# Patient Record
Sex: FEMALE | Race: WHITE | URBAN - NONMETROPOLITAN AREA
[De-identification: names, ages, dates, MRNs, and addresses within clinical notes are randomized per-mention and may not be internally consistent; named-entity substitution may affect disease eponyms.]

---

## 2017-01-04 RX ORDER — BUPROPION HYDROCHLORIDE 150 MG/1
150 TABLET ORAL EVERY MORNING
Qty: 30 TABLET | Refills: 0 | Status: SHIPPED | OUTPATIENT
Start: 2017-01-04 | End: 2017-02-01 | Stop reason: SDUPTHER

## 2017-01-04 RX ORDER — ZOLPIDEM TARTRATE 10 MG/1
10 TABLET ORAL NIGHTLY PRN
Qty: 30 TABLET | Refills: 0 | Status: SHIPPED | OUTPATIENT
Start: 2017-01-04 | End: 2017-02-01 | Stop reason: SDUPTHER

## 2017-02-21 ENCOUNTER — OFFICE VISIT (OUTPATIENT)
Dept: PSYCHIATRY | Age: 37
End: 2017-02-21

## 2017-02-21 DIAGNOSIS — F34.1 DYSTHYMIA: Primary | ICD-10-CM

## 2017-02-21 PROCEDURE — 99213 OFFICE O/P EST LOW 20 MIN: CPT | Performed by: NURSE PRACTITIONER

## 2017-02-21 RX ORDER — BUPROPION HYDROCHLORIDE 150 MG/1
300 TABLET ORAL EVERY MORNING
Qty: 60 TABLET | Refills: 3 | Status: SHIPPED | OUTPATIENT
Start: 2017-02-21 | End: 2017-06-09 | Stop reason: SDUPTHER

## 2017-02-21 RX ORDER — ZOLPIDEM TARTRATE 10 MG/1
10 TABLET ORAL NIGHTLY PRN
Qty: 30 TABLET | Refills: 0 | Status: SHIPPED | OUTPATIENT
Start: 2017-02-21 | End: 2017-03-24 | Stop reason: SDUPTHER

## 2017-03-24 RX ORDER — ZOLPIDEM TARTRATE 10 MG/1
10 TABLET ORAL NIGHTLY PRN
Qty: 30 TABLET | Refills: 0 | Status: SHIPPED | OUTPATIENT
Start: 2017-03-24 | End: 2017-04-23

## 2017-04-26 RX ORDER — ZOLPIDEM TARTRATE 10 MG/1
10 TABLET ORAL NIGHTLY PRN
Qty: 30 TABLET | Refills: 0 | Status: SHIPPED | OUTPATIENT
Start: 2017-04-26 | End: 2017-05-31 | Stop reason: SDUPTHER

## 2017-05-31 RX ORDER — ZOLPIDEM TARTRATE 10 MG/1
10 TABLET ORAL NIGHTLY PRN
Qty: 30 TABLET | Refills: 0 | Status: SHIPPED | OUTPATIENT
Start: 2017-05-31 | End: 2017-06-26 | Stop reason: SDUPTHER

## 2017-06-09 RX ORDER — BUPROPION HYDROCHLORIDE 150 MG/1
300 TABLET ORAL EVERY MORNING
Qty: 60 TABLET | Refills: 3 | Status: SHIPPED | OUTPATIENT
Start: 2017-06-09 | End: 2017-09-21 | Stop reason: SDUPTHER

## 2017-06-26 RX ORDER — ZOLPIDEM TARTRATE 10 MG/1
10 TABLET ORAL NIGHTLY PRN
Qty: 30 TABLET | Refills: 0 | Status: SHIPPED | OUTPATIENT
Start: 2017-06-26 | End: 2017-08-01 | Stop reason: SDUPTHER

## 2017-08-01 RX ORDER — ZOLPIDEM TARTRATE 10 MG/1
10 TABLET ORAL NIGHTLY PRN
Qty: 30 TABLET | Refills: 0 | Status: SHIPPED | OUTPATIENT
Start: 2017-08-01 | End: 2017-08-28 | Stop reason: SDUPTHER

## 2017-08-28 RX ORDER — ZOLPIDEM TARTRATE 10 MG/1
10 TABLET ORAL NIGHTLY PRN
Qty: 30 TABLET | Refills: 0 | Status: SHIPPED | OUTPATIENT
Start: 2017-08-28 | End: 2017-09-27 | Stop reason: SDUPTHER

## 2017-09-21 ENCOUNTER — OFFICE VISIT (OUTPATIENT)
Dept: PSYCHIATRY | Age: 37
End: 2017-09-21
Payer: COMMERCIAL

## 2017-09-21 VITALS — WEIGHT: 182 LBS

## 2017-09-21 DIAGNOSIS — F34.1 DYSTHYMIA: ICD-10-CM

## 2017-09-21 DIAGNOSIS — F41.0 PANIC ATTACK DUE TO EXCEPTIONAL STRESS: Primary | ICD-10-CM

## 2017-09-21 DIAGNOSIS — F43.0 PANIC ATTACK DUE TO EXCEPTIONAL STRESS: Primary | ICD-10-CM

## 2017-09-21 PROCEDURE — 99213 OFFICE O/P EST LOW 20 MIN: CPT | Performed by: NURSE PRACTITIONER

## 2017-09-21 RX ORDER — BUPROPION HYDROCHLORIDE 450 MG/1
450 TABLET, FILM COATED, EXTENDED RELEASE ORAL EVERY MORNING
Qty: 30 TABLET | Refills: 2 | Status: SHIPPED | OUTPATIENT
Start: 2017-09-21 | End: 2017-10-24 | Stop reason: SDUPTHER

## 2017-09-21 RX ORDER — HYDROXYZINE PAMOATE 25 MG/1
25 CAPSULE ORAL 3 TIMES DAILY PRN
Qty: 90 CAPSULE | Refills: 1 | Status: SHIPPED | OUTPATIENT
Start: 2017-09-21 | End: 2017-10-05

## 2017-09-22 RX ORDER — BUPROPION HYDROCHLORIDE 150 MG/1
450 TABLET ORAL EVERY MORNING
Qty: 90 TABLET | Refills: 2 | Status: SHIPPED | OUTPATIENT
Start: 2017-09-22 | End: 2017-12-27 | Stop reason: SDUPTHER

## 2017-09-27 RX ORDER — ZOLPIDEM TARTRATE 10 MG/1
10 TABLET ORAL NIGHTLY PRN
Qty: 30 TABLET | Refills: 0 | Status: SHIPPED | OUTPATIENT
Start: 2017-09-27 | End: 2017-10-24 | Stop reason: SDUPTHER

## 2017-10-24 ENCOUNTER — OFFICE VISIT (OUTPATIENT)
Dept: PSYCHIATRY | Age: 37
End: 2017-10-24
Payer: COMMERCIAL

## 2017-10-24 VITALS — WEIGHT: 180 LBS

## 2017-10-24 DIAGNOSIS — F41.9 ANXIETY DISORDER, UNSPECIFIED TYPE: ICD-10-CM

## 2017-10-24 DIAGNOSIS — F34.1 DYSTHYMIA: Primary | ICD-10-CM

## 2017-10-24 PROCEDURE — 99213 OFFICE O/P EST LOW 20 MIN: CPT | Performed by: NURSE PRACTITIONER

## 2017-10-24 PROCEDURE — 1036F TOBACCO NON-USER: CPT | Performed by: NURSE PRACTITIONER

## 2017-10-24 PROCEDURE — G8427 DOCREV CUR MEDS BY ELIG CLIN: HCPCS | Performed by: NURSE PRACTITIONER

## 2017-10-24 PROCEDURE — G8484 FLU IMMUNIZE NO ADMIN: HCPCS | Performed by: NURSE PRACTITIONER

## 2017-10-24 PROCEDURE — G8421 BMI NOT CALCULATED: HCPCS | Performed by: NURSE PRACTITIONER

## 2017-10-24 RX ORDER — ZOLPIDEM TARTRATE 10 MG/1
10 TABLET ORAL NIGHTLY PRN
Qty: 30 TABLET | Refills: 0 | Status: SHIPPED | OUTPATIENT
Start: 2017-10-24 | End: 2017-11-23

## 2017-10-24 RX ORDER — HYDROXYZINE 50 MG/1
50 TABLET, FILM COATED ORAL 3 TIMES DAILY PRN
Qty: 90 TABLET | Refills: 0 | Status: SHIPPED | OUTPATIENT
Start: 2017-10-24 | End: 2017-11-25 | Stop reason: SDUPTHER

## 2017-10-24 NOTE — PROGRESS NOTES
[facility] PROFESSIONAL SERVS  [facility]  [address] [facility]  Dept: [phone]  Dept Fax: [phone]: [phone]      Visit Date: 10/24/2017      Pertinent History & Psychiatric Examination      Harris Brewster is a 40 y.o.  female returns today after 1 month since our last visit for follow-up medication management . Chief Complaint   Patient presents with    Depression     Dysthymia    Anxiety     Anxiety disorder NOS         She reports with good improvement In her mood. Patient states \" I am better now at least and no longer sobbing in public\". Reports that she still depressed but not as elevated as in the past.  Patient has a history of dysthymia. She still reports increased anxiety and states that hydroxyzine is not helping. Patient reports tightness in the chest, unable to breath but denies any tremors/shakes or death/doom feelings. Patient also reports racing heart as well. She reports that stressors are work related and that she worries constantly. She reports that this happens about 5 times every week. Patient has had counseling but states that it did not help much. Patient is sleeping well on the Ambien and she denies any thoughts to hurt herself. She denies any hallucinations taking her medications and denies any side effects. 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 She is looking good and she is pleasant and cooperative and is requesting to return back to the office in 6 months time since she is doing okay. Past Surgical History:   Procedure Laterality Date    BREAST SURGERY       SECTION Bilateral      No family history on file.   Social History   Substance Use Topics    Smoking status: Never Smoker    Smokeless tobacco: Not on file    Alcohol use 1.8 oz/week     3 Glasses of wine per week      Comment: not daily     Current Juliette Castle, CNP on 10/24/2017 at 2:53 PM

## 2017-11-29 RX ORDER — ZOLPIDEM TARTRATE 10 MG/1
10 TABLET ORAL NIGHTLY PRN
Qty: 30 TABLET | Refills: 0 | Status: SHIPPED | OUTPATIENT
Start: 2017-11-29 | End: 2017-12-13

## 2017-12-26 NOTE — TELEPHONE ENCOUNTER
[facility] contacted office requesting a rx on ambien and wellbutrin, Bella Reid was seen in the office 10/24 and scheduled back on 4/24

## 2017-12-27 RX ORDER — ZOLPIDEM TARTRATE 10 MG/1
10 TABLET ORAL NIGHTLY PRN
Qty: 30 TABLET | Refills: 0 | Status: SHIPPED | OUTPATIENT
Start: 2017-12-27 | End: 2018-01-22 | Stop reason: SDUPTHER

## 2017-12-27 RX ORDER — BUPROPION HYDROCHLORIDE 150 MG/1
450 TABLET ORAL EVERY MORNING
Qty: 90 TABLET | Refills: 2 | Status: SHIPPED | OUTPATIENT
Start: 2017-12-27 | End: 2018-03-19 | Stop reason: SDUPTHER

## 2017-12-27 NOTE — TELEPHONE ENCOUNTER
[facility] behalf for a refill on bupropion 150mg er. Elian Loss was seen in the office on 10/24 and has a future appt. On 4/24.

## 2017-12-29 RX ORDER — HYDROXYZINE 50 MG/1
50 TABLET, FILM COATED ORAL 3 TIMES DAILY PRN
Qty: 90 TABLET | Refills: 0 | Status: SHIPPED | OUTPATIENT
Start: 2017-12-29 | End: 2018-01-08

## 2017-12-29 NOTE — TELEPHONE ENCOUNTER
Marvie Dakins called requesting a refill of Hydroxyzine      Last Appointment Date: 10/24/2017  Next Appointment Date: 4/24/2018

## 2018-01-22 RX ORDER — ZOLPIDEM TARTRATE 10 MG/1
10 TABLET ORAL NIGHTLY PRN
Qty: 30 TABLET | Refills: 0 | Status: SHIPPED | OUTPATIENT
Start: 2018-01-22 | End: 2018-02-26 | Stop reason: SDUPTHER

## 2018-02-26 NOTE — TELEPHONE ENCOUNTER
Leila Ramirez called requesting a refill of Ambien 10 mg      Last Appointment Date: 10/24/2017  Next Appointment Date: 4/24/2018

## 2018-02-27 RX ORDER — ZOLPIDEM TARTRATE 10 MG/1
10 TABLET ORAL NIGHTLY PRN
Qty: 30 TABLET | Refills: 0 | Status: SHIPPED | OUTPATIENT
Start: 2018-02-27 | End: 2018-03-13

## 2018-03-19 RX ORDER — BUPROPION HYDROCHLORIDE 150 MG/1
450 TABLET ORAL EVERY MORNING
Qty: 90 TABLET | Refills: 0 | Status: SHIPPED | OUTPATIENT
Start: 2018-03-19 | End: 2018-04-27 | Stop reason: SDUPTHER

## 2018-04-02 RX ORDER — ZOLPIDEM TARTRATE 10 MG/1
10 TABLET ORAL NIGHTLY PRN
Qty: 30 TABLET | Refills: 0 | Status: SHIPPED | OUTPATIENT
Start: 2018-04-02 | End: 2018-04-24 | Stop reason: DRUGHIGH

## 2018-04-24 ENCOUNTER — OFFICE VISIT (OUTPATIENT)
Dept: PSYCHIATRY | Age: 38
End: 2018-04-24

## 2018-04-24 VITALS — WEIGHT: 180 LBS

## 2018-04-24 DIAGNOSIS — F43.0 PANIC ATTACK DUE TO EXCEPTIONAL STRESS: ICD-10-CM

## 2018-04-24 DIAGNOSIS — F41.0 PANIC ATTACK DUE TO EXCEPTIONAL STRESS: ICD-10-CM

## 2018-04-24 DIAGNOSIS — F34.1 DYSTHYMIA: Primary | ICD-10-CM

## 2018-04-24 PROCEDURE — 99213 OFFICE O/P EST LOW 20 MIN: CPT | Performed by: NURSE PRACTITIONER

## 2018-04-24 RX ORDER — BUSPIRONE HYDROCHLORIDE 10 MG/1
10 TABLET ORAL 3 TIMES DAILY
Qty: 90 TABLET | Refills: 2 | Status: SHIPPED | OUTPATIENT
Start: 2018-04-24 | End: 2019-08-29 | Stop reason: ALTCHOICE

## 2018-04-24 RX ORDER — ZOLPIDEM TARTRATE 10 MG/1
10 TABLET ORAL NIGHTLY PRN
Qty: 30 TABLET | Refills: 0 | Status: SHIPPED | OUTPATIENT
Start: 2018-04-24 | End: 2018-06-13 | Stop reason: SDUPTHER

## 2018-04-27 RX ORDER — BUPROPION HYDROCHLORIDE 150 MG/1
450 TABLET ORAL EVERY MORNING
Qty: 90 TABLET | Refills: 2 | Status: SHIPPED | OUTPATIENT
Start: 2018-04-27 | End: 2018-07-25 | Stop reason: SDUPTHER

## 2018-06-13 DIAGNOSIS — G47.00 INSOMNIA, UNSPECIFIED TYPE: Primary | ICD-10-CM

## 2018-06-14 RX ORDER — ZOLPIDEM TARTRATE 10 MG/1
10 TABLET ORAL NIGHTLY PRN
Qty: 30 TABLET | Refills: 0 | Status: SHIPPED | OUTPATIENT
Start: 2018-06-14 | End: 2018-07-14

## 2018-07-16 DIAGNOSIS — G47.00 INSOMNIA, UNSPECIFIED TYPE: Primary | ICD-10-CM

## 2018-07-16 RX ORDER — ZOLPIDEM TARTRATE 10 MG/1
10 TABLET ORAL NIGHTLY PRN
Qty: 30 TABLET | Refills: 0 | Status: SHIPPED | OUTPATIENT
Start: 2018-07-16 | End: 2018-08-15

## 2018-07-16 NOTE — TELEPHONE ENCOUNTER
Reza Pedraza called into the office stating that she needs a medication refill on Ambien 10 mg; #30 with 0 refills; last refilled on 06/14/18 ending on 07/14/18. Patient's last completed apt was on 04/24/18 returning on 10/23/18.

## 2018-07-25 RX ORDER — BUPROPION HYDROCHLORIDE 150 MG/1
TABLET ORAL
Qty: 90 TABLET | Refills: 2 | Status: SHIPPED | OUTPATIENT
Start: 2018-07-25 | End: 2018-11-01 | Stop reason: SDUPTHER

## 2018-08-20 DIAGNOSIS — F51.01 PRIMARY INSOMNIA: Primary | ICD-10-CM

## 2018-08-20 RX ORDER — ZOLPIDEM TARTRATE 10 MG/1
10 TABLET ORAL NIGHTLY PRN
Qty: 30 TABLET | Refills: 0 | Status: SHIPPED | OUTPATIENT
Start: 2018-08-20 | End: 2018-09-19 | Stop reason: SDUPTHER

## 2018-09-19 DIAGNOSIS — F51.01 PRIMARY INSOMNIA: ICD-10-CM

## 2018-09-19 RX ORDER — ZOLPIDEM TARTRATE 10 MG/1
10 TABLET ORAL NIGHTLY PRN
Qty: 30 TABLET | Refills: 0 | Status: SHIPPED | OUTPATIENT
Start: 2018-09-19 | End: 2018-10-19

## 2018-10-22 DIAGNOSIS — F41.9 ANXIETY: Primary | ICD-10-CM

## 2018-10-23 RX ORDER — ZOLPIDEM TARTRATE 10 MG/1
10 TABLET ORAL NIGHTLY PRN
Qty: 30 TABLET | Refills: 0 | Status: SHIPPED | OUTPATIENT
Start: 2018-10-23 | End: 2018-11-26 | Stop reason: SDUPTHER

## 2018-10-30 ENCOUNTER — OFFICE VISIT (OUTPATIENT)
Dept: PSYCHIATRY | Age: 38
End: 2018-10-30
Payer: COMMERCIAL

## 2018-10-30 VITALS — HEART RATE: 71 BPM | WEIGHT: 179 LBS

## 2018-10-30 DIAGNOSIS — F34.1 DYSTHYMIA: ICD-10-CM

## 2018-10-30 DIAGNOSIS — F43.0 PANIC ATTACK DUE TO EXCEPTIONAL STRESS: Primary | ICD-10-CM

## 2018-10-30 DIAGNOSIS — F41.0 PANIC ATTACK DUE TO EXCEPTIONAL STRESS: Primary | ICD-10-CM

## 2018-10-30 PROCEDURE — 99214 OFFICE O/P EST MOD 30 MIN: CPT | Performed by: NURSE PRACTITIONER

## 2018-10-30 RX ORDER — CLONAZEPAM 0.5 MG/1
0.25 TABLET ORAL DAILY PRN
Qty: 15 TABLET | Refills: 0 | Status: SHIPPED | OUTPATIENT
Start: 2018-10-30 | End: 2019-02-25 | Stop reason: SDUPTHER

## 2018-11-01 RX ORDER — BUPROPION HYDROCHLORIDE 150 MG/1
TABLET ORAL
Qty: 90 TABLET | Refills: 3 | Status: SHIPPED | OUTPATIENT
Start: 2018-11-01 | End: 2019-03-03 | Stop reason: SDUPTHER

## 2018-11-26 DIAGNOSIS — F41.9 ANXIETY: ICD-10-CM

## 2018-11-26 RX ORDER — ZOLPIDEM TARTRATE 10 MG/1
10 TABLET ORAL NIGHTLY PRN
Qty: 30 TABLET | Refills: 0 | Status: SHIPPED | OUTPATIENT
Start: 2018-11-26 | End: 2018-12-21 | Stop reason: SDUPTHER

## 2018-12-21 DIAGNOSIS — F41.9 ANXIETY: ICD-10-CM

## 2018-12-21 RX ORDER — ZOLPIDEM TARTRATE 10 MG/1
10 TABLET ORAL NIGHTLY PRN
Qty: 30 TABLET | Refills: 0 | Status: SHIPPED | OUTPATIENT
Start: 2018-12-21 | End: 2019-01-20

## 2019-01-25 DIAGNOSIS — F51.01 PRIMARY INSOMNIA: Primary | ICD-10-CM

## 2019-01-25 RX ORDER — ZOLPIDEM TARTRATE 10 MG/1
10 TABLET ORAL NIGHTLY PRN
Qty: 30 TABLET | Refills: 0 | Status: SHIPPED | OUTPATIENT
Start: 2019-01-25 | End: 2019-02-25 | Stop reason: SDUPTHER

## 2019-02-25 DIAGNOSIS — F43.0 PANIC ATTACK DUE TO EXCEPTIONAL STRESS: ICD-10-CM

## 2019-02-25 DIAGNOSIS — F41.0 PANIC ATTACK DUE TO EXCEPTIONAL STRESS: ICD-10-CM

## 2019-02-25 DIAGNOSIS — F51.01 PRIMARY INSOMNIA: ICD-10-CM

## 2019-02-25 RX ORDER — ZOLPIDEM TARTRATE 10 MG/1
10 TABLET ORAL NIGHTLY PRN
Qty: 30 TABLET | Refills: 0 | Status: SHIPPED | OUTPATIENT
Start: 2019-02-25 | End: 2019-03-25

## 2019-02-26 RX ORDER — CLONAZEPAM 0.5 MG/1
TABLET ORAL
Qty: 15 TABLET | Refills: 0 | Status: SHIPPED | OUTPATIENT
Start: 2019-02-26 | End: 2019-08-29 | Stop reason: SDUPTHER

## 2019-03-04 RX ORDER — BUPROPION HYDROCHLORIDE 150 MG/1
TABLET ORAL
Qty: 90 TABLET | Refills: 1 | Status: SHIPPED | OUTPATIENT
Start: 2019-03-04 | End: 2019-05-09 | Stop reason: SDUPTHER

## 2019-03-25 DIAGNOSIS — F51.01 PRIMARY INSOMNIA: ICD-10-CM

## 2019-03-25 RX ORDER — ZOLPIDEM TARTRATE 10 MG/1
10 TABLET ORAL NIGHTLY PRN
Qty: 30 TABLET | Refills: 0 | Status: SHIPPED | OUTPATIENT
Start: 2019-03-25 | End: 2019-04-26 | Stop reason: SDUPTHER

## 2019-04-26 DIAGNOSIS — F51.01 PRIMARY INSOMNIA: ICD-10-CM

## 2019-04-26 RX ORDER — ZOLPIDEM TARTRATE 10 MG/1
10 TABLET ORAL NIGHTLY PRN
Qty: 5 TABLET | Refills: 0 | Status: SHIPPED | OUTPATIENT
Start: 2019-04-26 | End: 2019-05-03 | Stop reason: SDUPTHER

## 2019-04-26 NOTE — TELEPHONE ENCOUNTER
Angelita is a patient of [facility] and she is out of the office until Monday. Angelita has requested a refill of Ambien 10mg/hs. She attended an appointment in the office 10/30 and is scheduled to return 4/30.   Her last script was written on 3/25/19

## 2019-05-03 ENCOUNTER — OFFICE VISIT (OUTPATIENT)
Dept: PSYCHIATRY | Age: 39
End: 2019-05-03
Payer: COMMERCIAL

## 2019-05-03 VITALS — DIASTOLIC BLOOD PRESSURE: 67 MMHG | SYSTOLIC BLOOD PRESSURE: 112 MMHG | HEART RATE: 76 BPM | WEIGHT: 179 LBS

## 2019-05-03 DIAGNOSIS — F51.01 PRIMARY INSOMNIA: ICD-10-CM

## 2019-05-03 DIAGNOSIS — F34.1 DYSTHYMIA: Primary | ICD-10-CM

## 2019-05-03 DIAGNOSIS — G47.00 INSOMNIA, UNSPECIFIED TYPE: ICD-10-CM

## 2019-05-03 DIAGNOSIS — F41.9 ANXIETY DISORDER, UNSPECIFIED TYPE: ICD-10-CM

## 2019-05-03 PROCEDURE — 99214 OFFICE O/P EST MOD 30 MIN: CPT | Performed by: NURSE PRACTITIONER

## 2019-05-03 RX ORDER — ZOLPIDEM TARTRATE 10 MG/1
10 TABLET ORAL NIGHTLY PRN
Qty: 30 TABLET | Refills: 0 | Status: SHIPPED | OUTPATIENT
Start: 2019-05-03 | End: 2019-05-30
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
## 2019-05-08 NOTE — TELEPHONE ENCOUNTER
[facility] of Bupropion XL 150mg (total daily dose 450mg) on Slime's behalf. She attended an appointment in the office 5/3 and is scheduled to return 11/1/19 with Dr. Vidal Meyer.

## 2019-05-09 RX ORDER — BUPROPION HYDROCHLORIDE 150 MG/1
TABLET ORAL
Qty: 90 TABLET | Refills: 3 | Status: SHIPPED | OUTPATIENT
Start: 2019-05-09 | End: 2019-08-29 | Stop reason: SDUPTHER

## 2019-05-09 RX ORDER — BUPROPION HYDROCHLORIDE 150 MG/1
TABLET ORAL
Qty: 90 TABLET | Refills: 1 | Status: SHIPPED | OUTPATIENT
Start: 2019-05-09 | End: 2019-05-09 | Stop reason: SDUPTHER

## 2019-05-30 ENCOUNTER — TELEPHONE (OUTPATIENT)
Dept: PSYCHIATRY | Age: 39
End: 2019-05-30

## 2019-05-30 DIAGNOSIS — F51.01 PRIMARY INSOMNIA: ICD-10-CM

## 2019-05-30 RX ORDER — ZOLPIDEM TARTRATE 10 MG/1
10 TABLET ORAL NIGHTLY PRN
Qty: 30 TABLET | Refills: 0 | Status: SHIPPED | OUTPATIENT
Start: 2019-05-30 | End: 2019-07-02 | Stop reason: SDUPTHER

## 2019-05-30 NOTE — TELEPHONE ENCOUNTER
Aakash Parra is a pt of [facility] [address] , needing a rx on ambien 10. Aakash Parra was seen in the office on 5/3 and scheduled with Dr. Jodie Pina on 11/1.  Loaded pending approval

## 2019-07-02 DIAGNOSIS — F51.01 PRIMARY INSOMNIA: ICD-10-CM

## 2019-07-02 RX ORDER — ZOLPIDEM TARTRATE 10 MG/1
10 TABLET ORAL NIGHTLY PRN
Qty: 30 TABLET | Refills: 0 | Status: SHIPPED | OUTPATIENT
Start: 2019-07-02 | End: 2019-08-01 | Stop reason: SDUPTHER

## 2019-07-02 NOTE — TELEPHONE ENCOUNTER
Approved Ambien refill. OARRS was appropriate. Can we get this patient to follow up with me in August as I'm providing her a controlled substance and generally need to see them every 3 months.   Electronically signed by Gerda Vasquez MD on 7/2/2019 at 9:11 AM

## 2019-08-01 DIAGNOSIS — F51.01 PRIMARY INSOMNIA: ICD-10-CM

## 2019-08-01 RX ORDER — ZOLPIDEM TARTRATE 10 MG/1
10 TABLET ORAL NIGHTLY PRN
Qty: 30 TABLET | Refills: 0 | Status: SHIPPED | OUTPATIENT
Start: 2019-08-01 | End: 2019-08-29 | Stop reason: SDUPTHER

## 2019-08-01 NOTE — TELEPHONE ENCOUNTER
Approved Lluvia head. OARRS was ok.    Electronically signed by Eusebio Yeung MD on 8/1/2019 at 8:29 AM

## 2019-08-29 ENCOUNTER — OFFICE VISIT (OUTPATIENT)
Dept: PSYCHIATRY | Age: 39
End: 2019-08-29
Payer: COMMERCIAL

## 2019-08-29 DIAGNOSIS — F33.42 RECURRENT MAJOR DEPRESSIVE DISORDER, IN FULL REMISSION (HCC): Primary | ICD-10-CM

## 2019-08-29 DIAGNOSIS — F41.9 ANXIETY: ICD-10-CM

## 2019-08-29 DIAGNOSIS — F51.01 PRIMARY INSOMNIA: ICD-10-CM

## 2019-08-29 PROCEDURE — 99214 OFFICE O/P EST MOD 30 MIN: CPT | Performed by: PSYCHIATRY & NEUROLOGY

## 2019-08-29 RX ORDER — BUPROPION HYDROCHLORIDE 150 MG/1
450 TABLET ORAL EVERY MORNING
Qty: 90 TABLET | Refills: 5 | Status: SHIPPED | OUTPATIENT
Start: 2019-08-29 | End: 2020-04-30 | Stop reason: SDUPTHER

## 2019-08-29 RX ORDER — NALTREXONE HYDROCHLORIDE 50 MG/1
TABLET, FILM COATED ORAL
Qty: 30 TABLET | Refills: 3 | Status: SHIPPED | OUTPATIENT
Start: 2019-08-29 | End: 2019-11-01 | Stop reason: SDUPTHER

## 2019-08-29 RX ORDER — CLONAZEPAM 0.5 MG/1
0.25 TABLET ORAL DAILY PRN
Qty: 15 TABLET | Refills: 1 | Status: SHIPPED | OUTPATIENT
Start: 2019-08-29 | End: 2019-11-01 | Stop reason: SDUPTHER

## 2019-08-29 RX ORDER — ZOLPIDEM TARTRATE 10 MG/1
10 TABLET ORAL NIGHTLY PRN
Qty: 30 TABLET | Refills: 2 | Status: SHIPPED | OUTPATIENT
Start: 2019-08-29 | End: 2019-11-01 | Stop reason: SDUPTHER

## 2019-11-01 ENCOUNTER — OFFICE VISIT (OUTPATIENT)
Dept: PSYCHIATRY | Age: 39
End: 2019-11-01
Payer: COMMERCIAL

## 2019-11-01 DIAGNOSIS — F41.9 ANXIETY: ICD-10-CM

## 2019-11-01 DIAGNOSIS — F51.01 PRIMARY INSOMNIA: ICD-10-CM

## 2019-11-01 DIAGNOSIS — F33.42 RECURRENT MAJOR DEPRESSIVE DISORDER, IN FULL REMISSION (HCC): Primary | ICD-10-CM

## 2019-11-01 PROCEDURE — 99213 OFFICE O/P EST LOW 20 MIN: CPT | Performed by: PSYCHIATRY & NEUROLOGY

## 2019-11-01 RX ORDER — NALTREXONE HYDROCHLORIDE 50 MG/1
TABLET, FILM COATED ORAL
Qty: 30 TABLET | Refills: 3 | Status: SHIPPED | OUTPATIENT
Start: 2019-11-01 | End: 2020-04-30 | Stop reason: SDUPTHER

## 2019-11-01 RX ORDER — CLONAZEPAM 0.5 MG/1
0.25 TABLET ORAL DAILY PRN
Qty: 15 TABLET | Refills: 2 | Status: SHIPPED | OUTPATIENT
Start: 2019-11-01 | End: 2020-04-30 | Stop reason: SDUPTHER

## 2019-11-01 RX ORDER — ZOLPIDEM TARTRATE 10 MG/1
10 TABLET ORAL NIGHTLY PRN
Qty: 30 TABLET | Refills: 3 | Status: SHIPPED | OUTPATIENT
Start: 2019-11-01 | End: 2020-03-30 | Stop reason: SDUPTHER

## 2020-03-30 RX ORDER — ZOLPIDEM TARTRATE 10 MG/1
10 TABLET ORAL NIGHTLY PRN
Qty: 30 TABLET | Refills: 0 | Status: SHIPPED | OUTPATIENT
Start: 2020-03-30 | End: 2020-04-28

## 2020-04-29 RX ORDER — ZOLPIDEM TARTRATE 10 MG/1
TABLET ORAL
Qty: 30 TABLET | Refills: 0 | OUTPATIENT
Start: 2020-04-29

## 2020-04-30 ENCOUNTER — VIRTUAL VISIT (OUTPATIENT)
Dept: PSYCHIATRY | Age: 40
End: 2020-04-30
Payer: COMMERCIAL

## 2020-04-30 PROCEDURE — 99214 OFFICE O/P EST MOD 30 MIN: CPT | Performed by: PSYCHIATRY & NEUROLOGY

## 2020-04-30 RX ORDER — CLONAZEPAM 0.5 MG/1
0.5 TABLET ORAL DAILY PRN
Qty: 30 TABLET | Refills: 1 | Status: SHIPPED | OUTPATIENT
Start: 2020-04-30 | End: 2020-10-27

## 2020-04-30 RX ORDER — NALTREXONE HYDROCHLORIDE 50 MG/1
50 TABLET, FILM COATED ORAL DAILY
Qty: 30 TABLET | Refills: 1 | Status: SHIPPED | OUTPATIENT
Start: 2020-04-30 | End: 2020-07-13

## 2020-04-30 RX ORDER — BUPROPION HYDROCHLORIDE 150 MG/1
450 TABLET ORAL EVERY MORNING
Qty: 90 TABLET | Refills: 1 | Status: SHIPPED | OUTPATIENT
Start: 2020-04-30 | End: 2020-07-13

## 2020-04-30 RX ORDER — ZOLPIDEM TARTRATE 10 MG/1
TABLET ORAL
Qty: 30 TABLET | Refills: 1 | Status: SHIPPED | OUTPATIENT
Start: 2020-04-30 | End: 2020-07-27

## 2020-04-30 RX ORDER — PROPRANOLOL HYDROCHLORIDE 10 MG/1
10 TABLET ORAL 3 TIMES DAILY PRN
Qty: 90 TABLET | Refills: 1 | Status: SHIPPED | OUTPATIENT
Start: 2020-04-30 | End: 2020-10-27

## 2020-04-30 NOTE — PROGRESS NOTES
the [employer] Act, [facility] waiver authority and the Coronavirus Preparedness and [employer] Act, this Virtual Visit was conducted with patient's (and/or legal guardian's) consent, to reduce the patient's risk of exposure to COVID-19 and provide necessary medical care. The patient (and/or legal guardian) has also been advised to contact this office for worsening conditions or problems, and seek emergency medical treatment and/or call 911 if deemed necessary. Patient identification was verified at the start of the visit: Yes    Total time spent for this encounter: Not billed by time    Services were provided through a video synchronous discussion virtually to substitute for in-person clinic visit. Patient and provider were located at their individual homes. --Nai Vera MD on 4/30/2020 at 10:26 AM    An electronic signature was used to authenticate this note.

## 2020-06-05 ENCOUNTER — TELEPHONE (OUTPATIENT)
Dept: PSYCHIATRY | Age: 40
End: 2020-06-05

## 2020-06-15 NOTE — TELEPHONE ENCOUNTER
I am unable to provide this letter as I'm out on medical leave.    Electronically signed by Caro Daniel MD on 6/15/2020 at 2:35 PM

## 2020-07-13 RX ORDER — BUPROPION HYDROCHLORIDE 150 MG/1
TABLET ORAL
Qty: 90 TABLET | Refills: 0 | Status: SHIPPED | OUTPATIENT
Start: 2020-07-13 | End: 2020-08-19

## 2020-07-13 RX ORDER — NALTREXONE HYDROCHLORIDE 50 MG/1
TABLET, FILM COATED ORAL
Qty: 30 TABLET | Refills: 0 | Status: SHIPPED | OUTPATIENT
Start: 2020-07-13 | End: 2020-08-19

## 2020-07-27 RX ORDER — ZOLPIDEM TARTRATE 10 MG/1
TABLET ORAL
Qty: 30 TABLET | Refills: 1 | Status: SHIPPED | OUTPATIENT
Start: 2020-07-27 | End: 2020-08-27

## 2020-07-27 NOTE — TELEPHONE ENCOUNTER
THIS IS A DR. REYES PATIENT; SHE IS OUT. [facility] is requesting a medication refill on Slime's behalf for Ambien 10mg;#30 with 1 refill;last with a start date of 04/30/20 and last refill date of 06/27/20. Medication is pending your approval for a 30 day supply with 1 refill; she is scheduled to return on 08/06/20 with Dr. Maynor Ferrer.

## 2020-08-19 RX ORDER — BUPROPION HYDROCHLORIDE 150 MG/1
TABLET ORAL
Qty: 90 TABLET | Refills: 0 | Status: SHIPPED | OUTPATIENT
Start: 2020-08-19 | End: 2020-09-17

## 2020-08-19 RX ORDER — NALTREXONE HYDROCHLORIDE 50 MG/1
TABLET, FILM COATED ORAL
Qty: 30 TABLET | Refills: 0 | Status: SHIPPED | OUTPATIENT
Start: 2020-08-19 | End: 2020-10-27 | Stop reason: SDUPTHER

## 2020-09-17 RX ORDER — BUPROPION HYDROCHLORIDE 150 MG/1
TABLET ORAL
Qty: 90 TABLET | Refills: 0 | Status: SHIPPED | OUTPATIENT
Start: 2020-09-17 | End: 2020-10-23 | Stop reason: SDUPTHER

## 2020-09-17 NOTE — TELEPHONE ENCOUNTER
Jeannine Avila is requesting a medication refill on Wellbutrin XL 150mg;#90 with 0 refills;alst with a start date of 08/19/20. Medication is pending your approval for a 30 day supply with 0 refills; her last 2 appts were cancelled due to the provider being out of the office; at this time she does not have a future appt. I will contact the patient to schedule.

## 2020-09-28 RX ORDER — ZOLPIDEM TARTRATE 10 MG/1
10 TABLET ORAL NIGHTLY PRN
Qty: 30 TABLET | Refills: 0 | Status: SHIPPED | OUTPATIENT
Start: 2020-09-28 | End: 2020-10-26

## 2020-09-28 RX ORDER — ZOLPIDEM TARTRATE 5 MG/1
5 TABLET ORAL NIGHTLY PRN
Status: CANCELLED | OUTPATIENT
Start: 2020-09-28 | End: 2020-10-12

## 2020-09-28 NOTE — TELEPHONE ENCOUNTER
Fernando Paez contacted office requesting a rx on zolpidem, she is completely out.loaded zolpidem 10mg disp #30.

## 2020-10-23 RX ORDER — BUPROPION HYDROCHLORIDE 150 MG/1
TABLET ORAL
Qty: 90 TABLET | Refills: 1 | Status: SHIPPED | OUTPATIENT
Start: 2020-10-23 | End: 2020-12-22 | Stop reason: SDUPTHER

## 2020-10-27 ENCOUNTER — VIRTUAL VISIT (OUTPATIENT)
Dept: PSYCHIATRY | Age: 40
End: 2020-10-27
Payer: COMMERCIAL

## 2020-10-27 PROCEDURE — 99214 OFFICE O/P EST MOD 30 MIN: CPT | Performed by: PSYCHIATRY & NEUROLOGY

## 2020-10-27 RX ORDER — NALTREXONE HYDROCHLORIDE 50 MG/1
TABLET, FILM COATED ORAL
Qty: 30 TABLET | Refills: 1 | Status: SHIPPED | OUTPATIENT
Start: 2020-10-27 | End: 2020-12-22 | Stop reason: SDUPTHER

## 2020-10-27 RX ORDER — ESCITALOPRAM OXALATE 10 MG/1
10 TABLET ORAL DAILY
Qty: 30 TABLET | Refills: 1 | Status: SHIPPED | OUTPATIENT
Start: 2020-10-27 | End: 2020-12-22 | Stop reason: SDUPTHER

## 2020-10-27 NOTE — PROGRESS NOTES
[facility] PSYCHIATRY  [facility] [address] [phone]    Progress Note    Patient:  Theodore Kimbrough  YOB: 1980  PCP:  No primary care provider on file. Visit Date:  10/27/2020    TELEHEALTH EVALUATION -- Audio/Visual (During VBHUY-19 public health emergency)    Patient location: home  Physician location: [place], [facility]  This virtual visit was conducted via interactive, real-time video. Chief Complaint   Patient presents with    Follow-up    Medication Check    Anxiety       SUBJECTIVE:    Start time: 12:56pm  End time: 1:23pm    Theodore Kimbrough, a 45 y.o. female, presents for a follow up visit. Patient reports she is doing well. Patient is compliant with medication regimen. Presents alone. Depression has improved. Mood feels stable. Anxiety is \"terrible\". Work is \"crazy\". Pushed to the max. 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 Fast [employer]. Inderal didn't have any effect. Discussed it may have been too low of a dose. Benefit from past meds felt too short. Was on Lexapro for around 2 years. Thinks maybe it became less effective over time. Wants to retry it. May consider trying Inderal at higher dose later. We also discussed possible use of LA formulation. Sleep is good. Mood \"much better\". Sometimes \"snappy\" which she associates with high anxiety. Kids are back to school which reduces stress. Hasn't used Klonopin in awhile. After awhile it doesn't do anything. We agree to discontinue it and I mentioned reasons why it's poor long term treatment choice for anxiety. No SI. Past Medical, Social, Family Hx: see above    ROS:  Med Trials: Prozac, Wellbutrin XL, Lexapro, Vistaril, Atarax, Klonopin, Ambien, Buspar, trazodone, naltrexone, Inderal    OBJECTIVE:  Vitals: There were no vitals taken for this visit.     MENTAL STATUS EXAM:    GENERAL  Build: Normal    Hygiene: Appropriate in casual dress, seated in work office   SENSORIUM Orientation: Place, Person, Time, & Situation     Consciousness: Alert    ATTENTION   Focused    RELATEDNESS  Cooperative    EYE CONTACT   Good    PSYCHOMOTOR  Normal    SPEECH Volume: Normal     Rate: Normal rate and at times anxious tone    Amplitude: Within normal limits   MOOD  Anxious    AFFECT Range: Full    THOUGHT Process:  Goal-Directed     Content: no evidence of psychosis    COGNITION Insight: Good    Judgement:  Intact    MEMORY  Intact    INTELLIGENCE  Average     Mobility/Gait: Independently     Controlled SubstancesMonitoring:  not done today     ASSESSMENT: Will start Lexapro for anxiety, also possible mood benefit. Although mood currently good. Inderal 10 mg didn't have effect. May consider trying 20 mg or LA formulation going forward. No SI. Now off Klonopin. Wellbutrin has shown benefit for mood. I wonder whether it contributes to anxiety or irritability. Diagnosis Orders   1. Anxiety     2. Primary insomnia     3. Recurrent major depressive disorder, in full remission ([facility] [address]       PMH:     PLAN:     · Medications:   · Wellbutrin  mg QAM  · No longer taking Klonopin or Inderal   · Ambien 10 mg HS  · Naltrexone 50 mg QD   · Start Lexapro 5 mg QD x 1 week, then increase to 10 mg QD - discussed r/b/se/a, may start with 10 mg dose   · Therapy: previously saw Elaine Carpio [facility]  · Labs/Tests/Imaging: none   · Records Reviewed: [facility]  · Patient advised to call if patient has any difficulties with treatment  Return in about 6 weeks (around 12/8/2020) for med check, follow up. Electronically signed by Aguilar Harris MD on 10/27/2020 at 1:23 PM    Mia Ulloa is a 45 y.o. female being evaluated by a Virtual Visit (video visit) encounter to address concerns as mentioned above. A caregiver was present when appropriate.  Due to this being a TeleHealth encounter (During MQKWG-17 public health emergency), evaluation of the following organ systems was limited: Vitals/Constitutional/EENT/Resp/CV/GI//MS/Neuro/Skin/Heme-Lymph-Imm. Pursuant to the emergency declaration under the [address] [place] [address], [address] [place] [address] authority and the [employer] and [employer] Act, this Virtual Visit was conducted with patient's (and/or legal guardian's) consent, to reduce the patient's risk of exposure to COVID-19 and provide necessary medical care. The patient (and/or legal guardian) has also been advised to contact this office for worsening conditions or problems, and seek emergency medical treatment and/or call 911 if deemed necessary. Patient identification was verified at the start of the visit: Yes    Total time spent for this encounter: 27 minutes    Services were provided through a video synchronous discussion virtually to substitute for in-person clinic visit. Patient and provider were located at their individual homes. --Kayode Smart MD on 10/27/2020 at 1:23 PM    An electronic signature was used to authenticate this note.

## 2020-11-30 RX ORDER — ZOLPIDEM TARTRATE 10 MG/1
10 TABLET ORAL NIGHTLY PRN
Qty: 30 TABLET | Refills: 0 | Status: SHIPPED | OUTPATIENT
Start: 2020-11-30 | End: 2020-12-22 | Stop reason: SDUPTHER

## 2020-11-30 NOTE — TELEPHONE ENCOUNTER
Ion Vazquez is a patient of Dr. Daksha Rowland and she is out of the office. [facility] has requested a refill of Ambien 10mg/hs on her behalf. She attended an appointment 10/27 and is scheduled to return 12/22.

## 2020-12-22 ENCOUNTER — VIRTUAL VISIT (OUTPATIENT)
Dept: PSYCHIATRY | Age: 40
End: 2020-12-22
Payer: COMMERCIAL

## 2020-12-22 PROCEDURE — 99213 OFFICE O/P EST LOW 20 MIN: CPT | Performed by: PSYCHIATRY & NEUROLOGY

## 2020-12-22 RX ORDER — NALTREXONE HYDROCHLORIDE 50 MG/1
TABLET, FILM COATED ORAL
Qty: 30 TABLET | Refills: 2 | Status: SHIPPED | OUTPATIENT
Start: 2020-12-22 | End: 2021-03-23 | Stop reason: SDUPTHER

## 2020-12-22 RX ORDER — BUPROPION HYDROCHLORIDE 150 MG/1
TABLET ORAL
Qty: 90 TABLET | Refills: 2 | Status: SHIPPED | OUTPATIENT
Start: 2020-12-22 | End: 2021-03-23 | Stop reason: SDUPTHER

## 2020-12-22 RX ORDER — ESCITALOPRAM OXALATE 10 MG/1
10 TABLET ORAL DAILY
Qty: 30 TABLET | Refills: 2 | Status: SHIPPED | OUTPATIENT
Start: 2020-12-22 | End: 2021-03-23 | Stop reason: SDUPTHER

## 2020-12-22 RX ORDER — ZOLPIDEM TARTRATE 10 MG/1
10 TABLET ORAL NIGHTLY PRN
Qty: 30 TABLET | Refills: 2 | Status: SHIPPED | OUTPATIENT
Start: 2020-12-22 | End: 2021-03-22

## 2020-12-22 NOTE — PROGRESS NOTES
[facility] PSYCHIATRY  [facility] [id number] [phone]    Progress Note    Patient:  Jennifer Sidhu  YOB: 1980  PCP:  No primary care provider on file. Visit Date:  12/22/2020    TELEHEALTH EVALUATION -- Audio/Visual (During WKBUF-98 public health emergency)    Patient location: home  Physician location: home, [facility]  This virtual visit was conducted via interactive, real-time video. Chief Complaint   Patient presents with    Follow-up    Medication Check    Anxiety       SUBJECTIVE:      Jennifer Sidhu, a 45 y.o. female, presents for a follow up visit. Patient reports she is doing well. Patient is compliant with medication regimen. Presents alone. Noticed anxiety and mood benefit with Lexapro. Feeling \"really good\". Was taking a half tab BID. Feels better taking 1 tab once daily. Advised this medication is usually dosed once daily. Work is even more stressful. Despite that she is handling it well. On vacation right now. Denies any med side effects. Denies any current issues or need for medication change. No trouble with sleep or appetite. Seems to be doing fine off Klonopin. Would like to get her off Ambien if possible. Still using that. Past Medical, Social, Family Hx: see above    ROS:  Med Trials: Prozac, Wellbutrin XL, Lexapro, Vistaril, Atarax, Klonopin, Ambien, Buspar, trazodone, naltrexone, Inderal    OBJECTIVE:  Vitals: There were no vitals taken for this visit. MENTAL STATUS EXAM:    GENERAL  Build: Normal    Hygiene:  Appropriate    SENSORIUM Orientation: Place, Person, Time, & Situation     Consciousness: Alert    ATTENTION   Focused    RELATEDNESS  Cooperative    EYE CONTACT   Good    PSYCHOMOTOR  Normal    SPEECH Volume: Normal     Rate: Normal rate and upbeat tone    Amplitude:  Within normal limits   MOOD  Euthymic    AFFECT Range: Full, bright THOUGHT Process:  Goal-Directed     Content: no evidence of psychosis    COGNITION Insight: Good    Judgement:  Intact    MEMORY  Intact    INTELLIGENCE  Average     Mobility/Gait: Independently     Controlled SubstancesMonitoring: Periodic Controlled Substance Monitoring: No signs of potential drug abuse or diversion identified. , Possible medication side effects, risk of tolerance/dependence & alternative treatments discussed. Chon Harris MD)    ASSESSMENT: Anxiety improved with Lexapro. Also likely mood benefit. No changes to be made today. Doing well overall. Wellbutrin has shown benefit for mood. I wonder whether it contributes to anxiety or irritability. Diagnosis Orders   1. Anxiety     2. Primary insomnia  zolpidem (AMBIEN) 10 MG tablet   3. Rec[facility] major [facility]       PMH:     PLAN:     · Medications:   · Wellbutrin  mg QAM  · Ambien 10 mg HS  · Naltrexone 50 mg QD   · Lexapro 10 mg QD  · Therapy: previously saw Juliana Harper [facility] in [facility]  · Labs/Tests/Imaging: none   · Records Reviewed: [facility]  · Patient advised to call if patient has any difficulties with treatment  Return in about 3 months (around 3/22/2021) for med check, follow up. Electronically signed by Chon Harris MD on 12/22/2020 at 3:55 PM    Jia Garcia is a 45 y.o. female being evaluated by a Virtual Visit (video visit) encounter to address concerns as mentioned above. A caregiver was present when appropriate. Due to this being a TeleHealth encounter (During IPVBQ-08 public health emergency), evaluation of the following organ systems was limited: Vitals/Constitutional/EENT/Resp/CV/GI//MS/Neuro/Skin/Heme-Lymph-Imm.   Pursuant to the emergency declaration under the [facility] waiver authority and the [employer] and [employer] Act, this Virtual Visit was conducted with patient's (and/or legal guardian's) consent, to reduce the patient's risk of exposure to COVID-19 and provide necessary medical care. The patient (and/or legal guardian) has also been advised to contact this office for worsening conditions or problems, and seek emergency medical treatment and/or call 911 if deemed necessary. Patient identification was verified at the start of the visit: Yes    Total time spent for this encounter: Not billed by time    Services were provided through a video synchronous discussion virtually to substitute for in-person clinic visit. Patient and provider were located at their individual homes. --Daria Barrientos MD on 12/22/2020 at 3:55 PM    An electronic signature was used to authenticate this note.

## 2021-03-23 ENCOUNTER — VIRTUAL VISIT (OUTPATIENT)
Dept: PSYCHIATRY | Age: 41
End: 2021-03-23
Payer: COMMERCIAL

## 2021-03-23 DIAGNOSIS — F33.42 RECURRENT MAJOR DEPRESSIVE DISORDER, IN FULL REMISSION (HCC): Primary | ICD-10-CM

## 2021-03-23 DIAGNOSIS — F51.01 PRIMARY INSOMNIA: ICD-10-CM

## 2021-03-23 DIAGNOSIS — F41.9 ANXIETY: ICD-10-CM

## 2021-03-23 PROCEDURE — 99213 OFFICE O/P EST LOW 20 MIN: CPT | Performed by: PSYCHIATRY & NEUROLOGY

## 2021-03-23 RX ORDER — ESCITALOPRAM OXALATE 10 MG/1
10 TABLET ORAL DAILY
Qty: 30 TABLET | Refills: 2 | Status: SHIPPED | OUTPATIENT
Start: 2021-03-23 | End: 2021-06-24 | Stop reason: SDUPTHER

## 2021-03-23 RX ORDER — NALTREXONE HYDROCHLORIDE 50 MG/1
TABLET, FILM COATED ORAL
Qty: 30 TABLET | Refills: 2 | Status: SHIPPED | OUTPATIENT
Start: 2021-03-23 | End: 2021-06-24 | Stop reason: SDUPTHER

## 2021-03-23 RX ORDER — ZOLPIDEM TARTRATE 10 MG/1
10 TABLET ORAL NIGHTLY PRN
Qty: 30 TABLET | Refills: 2 | Status: SHIPPED | OUTPATIENT
Start: 2021-03-23 | End: 2021-06-21

## 2021-03-23 RX ORDER — BUPROPION HYDROCHLORIDE 150 MG/1
TABLET ORAL
Qty: 90 TABLET | Refills: 2 | Status: SHIPPED | OUTPATIENT
Start: 2021-03-23 | End: 2021-06-24 | Stop reason: SDUPTHER

## 2021-03-23 NOTE — PROGRESS NOTES
[facility] PSYCHIATRY  [facility] [id number] [phone]    Progress Note    Patient:  Sienna Caldwell  YOB: 1980  PCP:  No primary care provider on file. Visit Date:  3/23/2021    TELEHEALTH EVALUATION -- Audio/Visual (During REZBN-39 public health emergency)    Patient location: home  Physician location: [facility]  This virtual visit was conducted via interactive, real-time video. Chief Complaint   Patient presents with    Follow-up    Medication Check    Anxiety    Depression       SUBJECTIVE:      Sienna Caldwell, a 37 y.o. female, presents for a follow up visit. Patient reports she is doing well. Patient is compliant with medication regimen. Presents alone. Continues to do well. Denies any issues. Mood is good. Denies any depressive sxs. Anxiety feeling controlled. Kids are doing well. Work is going well. Covid stress letting up with work. Likes Lexapro at 10 mg. Denies need for further med change today. Sleep is good. Uses Ambien QHS. Energy is good. Getting out more and being active. Denies any side effects. Med Trials: Prozac, Wellbutrin XL, Lexapro, Vistaril, Atarax, Klonopin, Ambien, Buspar, trazodone, naltrexone, Inderal    OBJECTIVE:  Vitals: There were no vitals taken for this visit. MENTAL STATUS EXAM:    GENERAL  Build: Normal    Hygiene:  Appropriate in casual dress   SENSORIUM Orientation: Place, Person, Time, & Situation     Consciousness: Alert    ATTENTION   Focused    RELATEDNESS  Cooperative    EYE CONTACT   Good    PSYCHOMOTOR  Normal    SPEECH Volume: Normal     Rate: Normal rate and upbeat tone    Amplitude:  Within normal limits   MOOD  Euthymic    AFFECT Range: Full, mood congruent   THOUGHT Process:  Goal-Directed     Content: no evidence of psychosis    COGNITION Insight: Good    Judgement:  Intact    MEMORY  Intact    INTELLIGENCE Average     Mobility/Gait: Independently     Controlled SubstancesMonitoring: Periodic Controlled Substance Monitoring: No signs of potential drug abuse or diversion identified. , Possible medication side effects, risk of tolerance/dependence & alternative treatments discussed. Ivanna Joshi MD)    ASSESSMENT: Doing well. Mood good, anxiety controlled. No changes today. Wellbutrin has shown benefit for mood. Lexapro has improved mood and anxiety. Diagnosis Orders   1. Recurrent major depressive disorder, in full remission ([facility] [address]     2. Primary insomnia  zolpidem (AMBIEN) 10 MG tablet   3. Anxiety       Medical Hx:     PLAN:     · Medications:   · Wellbutrin  mg QAM  · Ambien 10 mg HS  · Naltrexone 50 mg QD   · Lexapro 10 mg QD  · Therapy: none, previously saw Carmina Pruitt [facility]  · Labs/Tests/Imaging: none   · Records Reviewed: [facility]  · Patient advised to call if patient has any difficulties with treatment  Return in about 3 months (around 6/23/2021) for med check, follow up. Electronically signed by Ivanna Joshi MD on 3/24/2021 at [address] [place] [address] is a 37 y.o. female being evaluated by a Virtual Visit (video visit) encounter to address concerns as mentioned above. A caregiver was present when appropriate. Due to this being a TeleHealth encounter (During [place] [address] public health emergency), evaluation of the following organ systems was limited: Vitals/Constitutional/EENT/Resp/CV/GI//MS/Neuro/Skin/Heme-Lymph-Imm. Pursuant to the emergency declaration under the [address] [place] [address], [address] [place] [address] authority and the [employer] Act, this Virtual Visit was conducted with patient's (and/or legal guardian's) consent, to reduce the patient's risk of exposure to COVID-19 and provide necessary medical care.   The patient (and/or legal guardian) has also been advised to contact this office for worsening conditions or problems, and seek emergency medical treatment and/or call 911 if deemed necessary. Patient identification was verified at the start of the visit: Yes    Total time spent for this encounter: Not billed by time    Services were provided through a video synchronous discussion virtually to substitute for in-person clinic visit. Patient and provider were located at their individual homes. --Belen Cobian MD on 3/24/2021 at 8:16 AM    An electronic signature was used to authenticate this note.

## 2021-06-24 ENCOUNTER — VIRTUAL VISIT (OUTPATIENT)
Dept: PSYCHIATRY | Age: 41
End: 2021-06-24
Payer: COMMERCIAL

## 2021-06-24 DIAGNOSIS — F33.42 RECURRENT MAJOR DEPRESSIVE DISORDER, IN FULL REMISSION (HCC): Primary | ICD-10-CM

## 2021-06-24 DIAGNOSIS — F41.9 ANXIETY: ICD-10-CM

## 2021-06-24 DIAGNOSIS — F51.01 PRIMARY INSOMNIA: ICD-10-CM

## 2021-06-24 PROCEDURE — 99213 OFFICE O/P EST LOW 20 MIN: CPT | Performed by: PSYCHIATRY & NEUROLOGY

## 2021-06-24 RX ORDER — ZOLPIDEM TARTRATE 10 MG/1
10 TABLET ORAL NIGHTLY PRN
Qty: 30 TABLET | Refills: 2 | Status: SHIPPED | OUTPATIENT
Start: 2021-06-24 | End: 2021-09-27

## 2021-06-24 RX ORDER — BUPROPION HYDROCHLORIDE 150 MG/1
TABLET ORAL
Qty: 90 TABLET | Refills: 3 | Status: SHIPPED | OUTPATIENT
Start: 2021-06-24 | End: 2021-11-15

## 2021-06-24 RX ORDER — NALTREXONE HYDROCHLORIDE 50 MG/1
TABLET, FILM COATED ORAL
Qty: 30 TABLET | Refills: 3 | Status: SHIPPED | OUTPATIENT
Start: 2021-06-24 | End: 2021-12-16

## 2021-06-24 RX ORDER — ESCITALOPRAM OXALATE 10 MG/1
10 TABLET ORAL DAILY
Qty: 30 TABLET | Refills: 3 | Status: SHIPPED | OUTPATIENT
Start: 2021-06-24 | End: 2021-11-15

## 2021-06-24 NOTE — PROGRESS NOTES
[facility] PSYCHIATRY  [facility] [id number] [phone]    Progress Note    Patient:  Luis De Souza  YOB: 1980  PCP:  No primary care provider on file. Visit Date:  6/24/2021    TELEHEALTH EVALUATION -- Audio/Visual (During ATYMV-01 public health emergency)    Patient location: home  Physician location: [place], [facility]  This virtual visit was conducted via interactive, real-time video. Chief Complaint   Patient presents with    Follow-up    Medication Check    Anxiety    Depression    Insomnia       SUBJECTIVE:      Luis De Souza, a 37 y.o. female, presents for a follow up visit. Patient reports she is doing well. Patient is compliant with medication regimen. Presents alone. Doing well. Remains stable. 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 Prefers working from home. Anxiety a bit worse going back to office but feels more manageable with Lexapro. Traveling to [facility] in Aug. Bringing her kids' friends. Feels \"really good\". Mood is \"happy\". Declines to make any changes. Appetite, energy and sleep are ok. Med Trials: Prozac, Wellbutrin XL, Lexapro, Vistaril, Atarax, Klonopin, Ambien, Buspar, trazodone, naltrexone, Inderal    OBJECTIVE:  Vitals: There were no vitals taken for this visit. MENTAL STATUS EXAM:    GENERAL  Build: Normal    Hygiene:  Appropriate, well groomed and dressed   SENSORIUM Orientation: Place, Person, Time, & Situation     Consciousness: Alert    ATTENTION   Focused    RELATEDNESS  Cooperative    EYE CONTACT   Good    PSYCHOMOTOR  Normal    SPEECH Volume: Normal     Rate: Normal rate and tone    Amplitude:  Within normal limits   MOOD  Euthymic    AFFECT Range: Full, bright   THOUGHT Process:  Goal-Directed     Content: no evidence of psychosis    COGNITION Insight: Good    Judgement:  Intact    MEMORY  Intact INTELLIGENCE  Average     Mobility/Gait: Independently     Controlled SubstancesMonitoring: Periodic Controlled Substance Monitoring: No signs of potential drug abuse or diversion identified. , Possible medication side effects, risk of tolerance/dependence & alternative treatments discussed. Sally Miles MD)    ASSESSMENT: No changes today. Mood good and stable. Anxiety under fair control. Wellbutrin has shown benefit for mood. Lexapro has improved mood and anxiety. Diagnosis Orders   1. Recurrent major depressive disorder, in full remission ([facility] [address]     2. Primary insomnia  zolpidem (AMBIEN) 10 MG tablet   3. Anxiety       Medical Hx:     PLAN:     · Medications:   · Wellbutrin  mg QAM  · Ambien 10 mg HS  · Naltrexone 50 mg QD   · Lexapro 10 mg QD  · Therapy: none, previously saw Marissa Perez [facility]  · Labs/Tests/Imaging: none   · Records Reviewed: [facility]  · Patient advised to call if patient has any difficulties with treatment  Return in about 4 months (around 10/24/2021) for follow up, med check. Electronically signed by Sally Miles MD on 6/24/2021 at 5:08 PM    Natalee Goss is a 37 y.o. female being evaluated by a Virtual Visit (video visit) encounter to address concerns as mentioned above. A caregiver was present when appropriate. Due to this being a TeleHealth encounter (During [facility] public health emergency), evaluation of the following organ systems was limited: Vitals/Constitutional/EENT/Resp/CV/GI//MS/Neuro/Skin/Heme-Lymph-Imm. Pursuant to the emergency declaration under the [address] [place] [address], [address] [place] [address] authority and the [employer] Act, this Virtual Visit was conducted with patient's (and/or legal guardian's) consent, to reduce the patient's risk of exposure to COVID-19 and provide necessary medical care.   The patient (and/or legal guardian) has also been never

## 2021-10-04 ENCOUNTER — TELEPHONE (OUTPATIENT)
Dept: PSYCHIATRY | Age: 41
End: 2021-10-04

## 2021-10-04 DIAGNOSIS — F41.9 ANXIETY: Primary | ICD-10-CM

## 2021-10-04 NOTE — TELEPHONE ENCOUNTER
Mahesh Pacheco called into the office stating that she would like a sooner appt; at this time there is nothing to offer; she is scheduled to return on 10/28/21. While on the phone, she said that her daughter just was diagnosed with a life changing condition and will be staying in the hospital starting Nov. 4th and for at least 11 days; she said ultimately she would be asking for a medication change. She reports that she is currently taking Ambien, Lexapro, Naltrexone and Wellbutrin but she is asking for something like Xanax or Valium to get her through this tough time. She said that she was previously on Klonopin but stopped it because she no longer needed it and is just requesting something temporary. She said that she is already in counseling with Marsha Nunez (part of [facility]). Please advise. She was last seen on 06/24/21. She is aware that this provider is out of the office today.

## 2021-10-05 RX ORDER — CLONAZEPAM 0.5 MG/1
.25-.5 TABLET ORAL 2 TIMES DAILY PRN
Qty: 60 TABLET | Refills: 0 | Status: SHIPPED | OUTPATIENT
Start: 2021-10-05 | End: 2021-11-15

## 2021-10-05 NOTE — TELEPHONE ENCOUNTER
I went ahead and sent Rx for Klonopin (which is very similar to Valium and also similar to Xanax but longer acting) since she had done ok with that in the past.   Electronically signed by Rolando Perez MD on 10/5/2021 at 12:06 PM

## 2021-11-14 DIAGNOSIS — F41.9 ANXIETY: ICD-10-CM

## 2021-11-15 RX ORDER — BUPROPION HYDROCHLORIDE 150 MG/1
TABLET ORAL
Qty: 90 TABLET | Refills: 1 | Status: SHIPPED | OUTPATIENT
Start: 2021-11-15 | End: 2021-12-23 | Stop reason: SDUPTHER

## 2021-11-15 RX ORDER — ESCITALOPRAM OXALATE 10 MG/1
TABLET ORAL
Qty: 30 TABLET | Refills: 1 | Status: SHIPPED | OUTPATIENT
Start: 2021-11-15 | End: 2021-12-23 | Stop reason: SDUPTHER

## 2021-11-15 RX ORDER — CLONAZEPAM 0.5 MG/1
TABLET ORAL
Qty: 60 TABLET | Refills: 0 | Status: SHIPPED | OUTPATIENT
Start: 2021-11-15 | End: 2021-12-16

## 2021-11-15 NOTE — TELEPHONE ENCOUNTER
[address] [place] [address] is requesting prescriptions on behalf of the patient. She was last seen 06/24/2021, next appointment scheduled is 12/23. Prescriptions pending for   Bupropion 150 mg, three tablets every morning #90, 1 refill.   Klonopin 0.5 mg 0.5-1 tablet bid prn, #60, 0 refills  Escitalopram 10 mg, 1 tab daily, #30, 1 refills

## 2021-12-15 DIAGNOSIS — F41.9 ANXIETY: ICD-10-CM

## 2021-12-16 RX ORDER — NALTREXONE HYDROCHLORIDE 50 MG/1
TABLET, FILM COATED ORAL
Qty: 30 TABLET | Refills: 0 | Status: SHIPPED | OUTPATIENT
Start: 2021-12-16 | End: 2021-12-23 | Stop reason: SDUPTHER

## 2021-12-16 RX ORDER — CLONAZEPAM 0.5 MG/1
TABLET ORAL
Qty: 60 TABLET | Refills: 0 | Status: SHIPPED | OUTPATIENT
Start: 2021-12-16 | End: 2021-12-23 | Stop reason: SDUPTHER

## 2021-12-16 NOTE — TELEPHONE ENCOUNTER
[place] is requesting refills on behalf of the patient.  Patient is scheduled to follow up 12/23/2021    Prescription pending approval for  Clonazepam 0.5 mg, TAKE1/2-1 TABLET BY MOUTH BID PRN FOR ANXIETY,  #60, 0 refills  Naltrexone 50 mg, daily, #30, 0 refills

## 2021-12-23 ENCOUNTER — VIRTUAL VISIT (OUTPATIENT)
Dept: PSYCHIATRY | Age: 41
End: 2021-12-23
Payer: COMMERCIAL

## 2021-12-23 DIAGNOSIS — F41.9 ANXIETY: Primary | ICD-10-CM

## 2021-12-23 DIAGNOSIS — F33.42 RECURRENT MAJOR DEPRESSIVE DISORDER, IN FULL REMISSION (HCC): ICD-10-CM

## 2021-12-23 DIAGNOSIS — F51.01 PRIMARY INSOMNIA: ICD-10-CM

## 2021-12-23 PROCEDURE — 99213 OFFICE O/P EST LOW 20 MIN: CPT | Performed by: PSYCHIATRY & NEUROLOGY

## 2021-12-23 RX ORDER — ZOLPIDEM TARTRATE 10 MG/1
10 TABLET ORAL NIGHTLY PRN
Qty: 30 TABLET | Refills: 2 | Status: SHIPPED | OUTPATIENT
Start: 2021-12-23 | End: 2022-03-23 | Stop reason: SDUPTHER

## 2021-12-23 RX ORDER — NALTREXONE HYDROCHLORIDE 50 MG/1
50 TABLET, FILM COATED ORAL DAILY
Qty: 30 TABLET | Refills: 2 | Status: SHIPPED | OUTPATIENT
Start: 2021-12-23 | End: 2022-03-23 | Stop reason: SDUPTHER

## 2021-12-23 RX ORDER — CLONAZEPAM 0.5 MG/1
0.5 TABLET ORAL 2 TIMES DAILY PRN
Qty: 60 TABLET | Refills: 1 | Status: SHIPPED | OUTPATIENT
Start: 2021-12-23 | End: 2022-09-22 | Stop reason: SDUPTHER

## 2021-12-23 RX ORDER — ESCITALOPRAM OXALATE 10 MG/1
10 TABLET ORAL DAILY
Qty: 30 TABLET | Refills: 2 | Status: SHIPPED | OUTPATIENT
Start: 2021-12-23 | End: 2022-03-23 | Stop reason: SDUPTHER

## 2021-12-23 RX ORDER — BUPROPION HYDROCHLORIDE 150 MG/1
450 TABLET ORAL EVERY MORNING
Qty: 90 TABLET | Refills: 2 | Status: SHIPPED | OUTPATIENT
Start: 2021-12-23 | End: 2022-03-23 | Stop reason: SDUPTHER

## 2021-12-23 NOTE — PROGRESS NOTES
[facility] PSYCHIATRY  [facility] [id number] [phone]    Progress Note    Patient:  Jada Galvin  YOB: 1980  PCP:  No primary care provider on file. Visit Date:  12/23/2021    TELEHEALTH EVALUATION -- Audio/Visual (During DGBZO-08 public health emergency)    Patient location: home  Physician location: [place], [facility]  This virtual visit was conducted via interactive, real-time video. Chief Complaint   Patient presents with    Follow-up    Medication Check    Anxiety    Depression    Insomnia       SUBJECTIVE:      Jada Galvin, a 37 y.o. female, presents for a follow up visit. Patient reports she is doing well. Patient is compliant with medication regimen. Presents alone. Doing well overall. More stress lately. Discusses her 15 yo dtr's medical issues, was dx with \"severe\" scoliosis. She had a spinal fusion through all her thoracic and lumbar spine. Her dtr is emotional but handling things ok. Has been off work since Nov 1 and returns to on Jan 4 Lucas Sanchez is going to be a disaster\". Notes her dtr will be returning to school as well. Used Klonopin while her dtr was inpatient x a week. It was helpful. Still uses it as prn anxiety. [facility] per [facility]. Sleep is okay. There were times she wouldn't use Ambien because wanted to be up to help her dtr. Mood is \"okay\" noting there were some \"low points\" related to her dtr's emotional struggle and being in pain. Typical stress of dealing with a teenager. Her dtr is seeing her old therapist Judy Fleming. Pt isn't currently in therapy. Declines to make any further changes today. Med Trials: Prozac, Wellbutrin XL, Lexapro, Vistaril, Atarax, Klonopin, Ambien, Buspar, trazodone, naltrexone, Inderal    OBJECTIVE:  Vitals: There were no vitals taken for this visit.     MENTAL STATUS EXAM:    GENERAL Build: Normal    Hygiene:  Appropriate   SENSORIUM Orientation: Place, Person, Time, & Situation     Consciousness: Alert    ATTENTION   Focused    RELATEDNESS  Cooperative    EYE CONTACT   Good    PSYCHOMOTOR  Normal    SPEECH Volume: Normal     Rate: Normal rate and tone    Amplitude: Within normal limits   MOOD  \"okay\"    AFFECT Range: Full, mood congruent   THOUGHT Process:  Goal-Directed     Content: no evidence of psychosis    COGNITION Insight: Good    Judgement:  Intact    MEMORY  Intact    INTELLIGENCE  Average     Mobility/Gait: Independently     Controlled SubstancesMonitoring:  not done today    ASSESSMENT: No changes. Mood is stable. Some worsening mood and anxiety in context of dtr's recent health stressors which is improved. Wellbutrin has shown benefit for mood. Lexapro has improved mood and anxiety. Diagnosis Orders   1. Anxiety  clonazePAM (KLONOPIN) 0.5 MG tablet   2. Primary insomnia  zolpidem (AMBIEN) 10 MG tablet   3. [facility]           PLAN:     · Medications:   · Wellbutrin  mg QAM  · Ambien 10 mg HS  · Naltrexone 50 mg QD   · Lexapro 10 mg QD  · Klonopin 0.5 mg BID prn anxiety  · Therapy: none, previously saw Steev Tristan at [facility] in [facility]  · Labs/Tests/Imaging: none   · Records Reviewed: [facility]  · Patient advised to call if patient has any difficulties with treatment  Return in about 3 months (around 3/23/2022) for med check, follow up. Electronically signed by Nicola Reyna MD on 12/23/2021 at 11:47 AM    Tamra Amador is a 37 y.o. female being evaluated by a Virtual Visit (video visit) encounter to address concerns as mentioned above. A caregiver was present when appropriate. Due to this being a TeleHealth encounter (During JDRPY-13 public health emergency), evaluation of the following organ systems was limited: Vitals/Constitutional/EENT/Resp/CV/GI//MS/Neuro/Skin/Heme-Lymph-Imm.   Pursuant to the emergency declaration under the [facility] waiver authority and the [employer] and [employer] Act, this Virtual Visit was conducted with patient's (and/or legal guardian's) consent, to reduce the patient's risk of exposure to COVID-19 and provide necessary medical care. The patient (and/or legal guardian) has also been advised to contact this office for worsening conditions or problems, and seek emergency medical treatment and/or call 911 if deemed necessary. Patient identification was verified at the start of the visit: Yes    Total time spent for this encounter: Not billed by time    Services were provided through a video synchronous discussion virtually to substitute for in-person clinic visit. Patient and provider were located at their individual homes. --Corby Granados MD on 12/23/2021 at 11:47 AM    An electronic signature was used to authenticate this note.

## 2022-03-23 ENCOUNTER — TELEMEDICINE (OUTPATIENT)
Dept: PSYCHIATRY | Age: 42
End: 2022-03-23
Payer: COMMERCIAL

## 2022-03-23 DIAGNOSIS — F41.9 ANXIETY: ICD-10-CM

## 2022-03-23 DIAGNOSIS — F51.01 PRIMARY INSOMNIA: ICD-10-CM

## 2022-03-23 DIAGNOSIS — F33.42 RECURRENT MAJOR DEPRESSIVE DISORDER, IN FULL REMISSION (HCC): Primary | ICD-10-CM

## 2022-03-23 PROCEDURE — 99213 OFFICE O/P EST LOW 20 MIN: CPT | Performed by: PSYCHIATRY & NEUROLOGY

## 2022-03-23 RX ORDER — ZOLPIDEM TARTRATE 10 MG/1
10 TABLET ORAL NIGHTLY PRN
Qty: 30 TABLET | Refills: 2 | Status: SHIPPED | OUTPATIENT
Start: 2022-03-23 | End: 2022-06-29

## 2022-03-23 RX ORDER — ESCITALOPRAM OXALATE 10 MG/1
10 TABLET ORAL DAILY
Qty: 30 TABLET | Refills: 2 | Status: SHIPPED | OUTPATIENT
Start: 2022-03-23 | End: 2022-07-25

## 2022-03-23 RX ORDER — BUPROPION HYDROCHLORIDE 150 MG/1
450 TABLET ORAL EVERY MORNING
Qty: 90 TABLET | Refills: 2 | Status: SHIPPED | OUTPATIENT
Start: 2022-03-23 | End: 2022-06-29

## 2022-03-23 RX ORDER — NALTREXONE HYDROCHLORIDE 50 MG/1
50 TABLET, FILM COATED ORAL DAILY
Qty: 30 TABLET | Refills: 2 | Status: SHIPPED | OUTPATIENT
Start: 2022-03-23 | End: 2022-07-25

## 2022-03-23 NOTE — PROGRESS NOTES
Mobility/Gait: Independently     Controlled SubstancesMonitoring: Periodic Controlled Substance Monitoring: No signs of potential drug abuse or diversion identified. ,Possible medication side effects, risk of tolerance/dependence & alternative treatments discussed. Esperanza Jurado MD)    ASSESSMENT: No changes per her request. Doing well overall. Wellbutrin has shown benefit for mood. Lexapro has improved mood and anxiety. Diagnosis Orders   1. Recurrent major depressive disorder, in full remission ([facility] [address]     2. Primary insomnia  zolpidem (AMBIEN) 10 MG tablet   3. Anxiety         PLAN:     · Medications:   · Wellbutrin  mg QAM  · Ambien 10 mg HS  · Naltrexone 50 mg QD   · Lexapro 10 mg QD  · Klonopin 0.5 mg BID prn anxiety  · Therapy: none, previously [facility] Joseline [facility]  · Labs/Tests/Imaging: none   · Records Reviewed: [facility]  · Patient advised to call if patient has any difficulties with treatment  Return in about 6 months (around 9/23/2022) for med check, follow up. Blake Whitmore, was evaluated through a synchronous (real-time) audio-video encounter. The patient (or guardian if applicable) is aware that this is a billable service, which includes applicable copays. This virtual visit was conducted with patient's (and/or legal guardian's) consent. The visit was conducted pursuant to the emergency declaration under the [address] [place] [address], [address] [place] [address] authority and the [employer] and [employer] General Act. Patient identification was verified, and a caregiver was present when appropriate. The patient was located in a state where the provider was licensed to provide care.       Total time spent for this encounter: not billed by time    Electronically signed by Esperanza Jurado MD on 3/23/2022 at 9:17 AM

## 2022-06-29 DIAGNOSIS — F51.01 PRIMARY INSOMNIA: ICD-10-CM

## 2022-06-29 RX ORDER — ZOLPIDEM TARTRATE 10 MG/1
TABLET ORAL
Qty: 30 TABLET | Refills: 2 | Status: SHIPPED | OUTPATIENT
Start: 2022-06-29 | End: 2022-07-29

## 2022-06-29 RX ORDER — BUPROPION HYDROCHLORIDE 150 MG/1
TABLET ORAL
Qty: 90 TABLET | Refills: 2 | Status: SHIPPED | OUTPATIENT
Start: 2022-06-29 | End: 2022-09-22 | Stop reason: SDUPTHER

## 2022-06-29 NOTE — TELEPHONE ENCOUNTER
Jeannine Avila is requesting a medication refill on [facility] behalf for Ambien 10mg;#30 with 0 refills; last with a start date of 03/23/22 and last refill date of 06/01/22 along with Wellbutrin XL 150mg;#90 with 2 refills; last with a start date of 03/23/22 and last refill date of 05/31/22. Medications are pending your approval for a 30 day supply with 2 refills; she is scheduled to return on 09/22/22. Last seen on 03/23/22.

## 2022-07-25 RX ORDER — NALTREXONE HYDROCHLORIDE 50 MG/1
TABLET, FILM COATED ORAL
Qty: 30 TABLET | Refills: 1 | Status: SHIPPED | OUTPATIENT
Start: 2022-07-25 | End: 2022-09-22 | Stop reason: SDUPTHER

## 2022-07-25 RX ORDER — ESCITALOPRAM OXALATE 10 MG/1
TABLET ORAL
Qty: 30 TABLET | Refills: 1 | Status: SHIPPED | OUTPATIENT
Start: 2022-07-25 | End: 2022-09-22 | Stop reason: SDUPTHER

## 2022-07-25 NOTE — TELEPHONE ENCOUNTER
[facility] is requesting a refill on the following medications:  Requested Prescriptions     Pending Prescriptions Disp Refills    escitalopram (LEXAPRO) 10 MG tablet [Pharmacy Med Name: ESCITALOPRAM 10 MG TABLET] 30 tablet 1     Sig: TAKE ONE TABLET BY MOUTH DAILY    naltrexone (DEPADE) 50 MG tablet [Pharmacy Med Name: NALTREXONE 50 MG TABLET] 30 tablet 1     Sig: TAKE ONE TABLET BY MOUTH DAILY       Date of last visit: 3/23/2022  Date of next visit (if applicable):9/22/2022  [place] [facility] [place]

## 2022-09-22 ENCOUNTER — TELEMEDICINE (OUTPATIENT)
Dept: PSYCHIATRY | Age: 42
End: 2022-09-22
Payer: COMMERCIAL

## 2022-09-22 DIAGNOSIS — F41.9 ANXIETY: ICD-10-CM

## 2022-09-22 DIAGNOSIS — F51.01 PRIMARY INSOMNIA: ICD-10-CM

## 2022-09-22 DIAGNOSIS — F33.42 RECURRENT MAJOR DEPRESSIVE DISORDER, IN FULL REMISSION (HCC): Primary | ICD-10-CM

## 2022-09-22 PROCEDURE — 99213 OFFICE O/P EST LOW 20 MIN: CPT | Performed by: PSYCHIATRY & NEUROLOGY

## 2022-09-22 RX ORDER — CLONAZEPAM 0.5 MG/1
0.5 TABLET ORAL 2 TIMES DAILY PRN
Qty: 60 TABLET | Refills: 2 | Status: SHIPPED | OUTPATIENT
Start: 2022-09-22 | End: 2022-12-21

## 2022-09-22 RX ORDER — BUPROPION HYDROCHLORIDE 150 MG/1
TABLET ORAL
Qty: 90 TABLET | Refills: 5 | Status: SHIPPED | OUTPATIENT
Start: 2022-09-22

## 2022-09-22 RX ORDER — ZOLPIDEM TARTRATE 10 MG/1
10 TABLET ORAL NIGHTLY PRN
Qty: 30 TABLET | Refills: 2 | Status: SHIPPED | OUTPATIENT
Start: 2022-09-22 | End: 2022-12-21

## 2022-09-22 RX ORDER — ESCITALOPRAM OXALATE 10 MG/1
10 TABLET ORAL DAILY
Qty: 30 TABLET | Refills: 5 | Status: SHIPPED | OUTPATIENT
Start: 2022-09-22

## 2022-09-22 RX ORDER — NALTREXONE HYDROCHLORIDE 50 MG/1
TABLET, FILM COATED ORAL
Qty: 30 TABLET | Refills: 5 | Status: SHIPPED | OUTPATIENT
Start: 2022-09-22

## 2022-09-22 NOTE — PROGRESS NOTES
[facility] PSYCHIATRY  [facility] [id number] [phone]    Progress Note    Patient:  Oc Herrera  YOB: 1980  PCP:  No primary care provider on file. Visit Date:  9/22/2022    TELEHEALTH EVALUATION -- Audio/Visual (During QWKVJ-55 public health emergency)    Patient location: home  Physician location: [place], [facility]  This virtual visit was conducted via interactive, real-time video. Chief Complaint   Patient presents with    Follow-up    Medication Check    Depression    Anxiety    Insomnia       SUBJECTIVE:      Oc Herrera, a 40 y.o. female, presents for a follow up visit. Patient reports she is doing well. Patient is compliant with medication regimen. Presents alone. Doing well. Busy with work. Mood \"really good\". \"Very stable. \"  No issues since last seeing me in March. Only concern is anxiety that fluctuates with stress mostly from work. Her daughter has made a great recovery from her spinal fusion currently in soccer season. Declines to make any med changes. Divorce 6 years ago was cause of emotional instability and further she gets from that the better she feels. Med Trials: Prozac, Wellbutrin XL, Lexapro, Vistaril, Atarax, Klonopin, Ambien, Buspar, trazodone, naltrexone, Inderal    OBJECTIVE:  Vitals: There were no vitals taken for this visit. MENTAL STATUS EXAM:    GENERAL  Build: Normal    Hygiene:  Appropriate, well dressed and groomed   SENSORIUM Orientation: Place, Person, Time, & Situation     Consciousness: Alert    ATTENTION   Focused    RELATEDNESS  Cooperative    EYE CONTACT   Good    PSYCHOMOTOR  Normal    SPEECH Volume: Normal     Rate: Normal rate and upbeat tone    Amplitude:  Within normal limits   MOOD  \"Really good\"     AFFECT Range: Full, mood congruent   THOUGHT Process:  Goal-Directed     Content: no evidence of psychosis    COGNITION Insight: Good    Judgement:  Intact    MEMORY  Intact    INTELLIGENCE  Average     Mobility/Gait: Independently     Controlled SubstancesMonitoring: Periodic Controlled Substance Monitoring: No signs of potential drug abuse or diversion identified. , Possible medication side effects, risk of tolerance/dependence & alternative treatments discussed. Gonzales Mcwilliams MD)    ASSESSMENT: Doing well. No changes today. Wellbutrin has shown benefit for mood. Lexapro has improved mood and anxiety. Diagnosis Orders   1. Recurrent major depressive disorder, in full remission ([facility] [address]        2. Anxiety  clonazePAM (KLONOPIN) 0.5 MG tablet      3. Primary insomnia  zolpidem (AMBIEN) 10 MG tablet          PLAN:     Medications: Wellbutrin  mg QAM  Ambien 10 mg HS  Naltrexone 50 mg QD   Lexapro 10 mg QD  Klonopin 0.5 mg BID prn anxiety  Therapy: none, [facility]  Labs/Tests/Imaging: none   Records Reviewed: [facility]  Patient advised to call if patient has any difficulties with treatment  Return in about 6 months (around 3/22/2023) for follow up, med check. Monica Jacobson, was evaluated through a synchronous (real-time) audio-video encounter. The patient (or guardian if applicable) is aware that this is a billable service, which includes applicable copays. This virtual visit was conducted with patient's (and/or legal guardian's) consent. The visit was conducted pursuant to the emergency declaration under the [address] [place] [address], [address] [place] [address] authority and the [employer] General Act. Patient identification was verified, and a caregiver was present when appropriate. The patient was located in a state where the provider was licensed to provide care.       Total time spent for this encounter: not billed by time    Electronically signed by Gonzales Mcwilliams MD on 9/22/2022 at 9:16 AM

## 2022-12-30 DIAGNOSIS — F51.01 PRIMARY INSOMNIA: ICD-10-CM

## 2023-01-03 DIAGNOSIS — F51.01 PRIMARY INSOMNIA: ICD-10-CM

## 2023-01-03 RX ORDER — ZOLPIDEM TARTRATE 10 MG/1
10 TABLET ORAL NIGHTLY PRN
Qty: 30 TABLET | Refills: 0 | Status: SHIPPED | OUTPATIENT
Start: 2023-01-03 | End: 2023-02-02 | Stop reason: SDUPTHER

## 2023-01-03 RX ORDER — ZOLPIDEM TARTRATE 10 MG/1
10 TABLET ORAL NIGHTLY PRN
Qty: 30 TABLET | Refills: 0 | Status: CANCELLED | OUTPATIENT
Start: 2023-01-03 | End: 2023-04-03

## 2023-01-03 NOTE — TELEPHONE ENCOUNTER
[facility] called requesting a refill on the following medications:  Requested Prescriptions     Pending Prescriptions Disp Refills    zolpidem (AMBIEN) 10 MG tablet 30 tablet 0     Sig: Take 1 tablet by mouth nightly as needed for Sleep for up to 90 days. Patient reports she is out of medication and would like this sent as soon as possible. She is aware that Dr. Heller is out on medical leave and request will be sent to covering provider.      Date of last visit: 9/22/2022  Date of next visit (if applicable):3/22/2023  [place] [facility] [place]

## 2023-02-01 DIAGNOSIS — F51.01 PRIMARY INSOMNIA: ICD-10-CM

## 2023-02-01 NOTE — TELEPHONE ENCOUNTER
Annalee Hammond PATIENT    On[facility] called requesting a refill on the following medications:  Requested Prescriptions     Pending Prescriptions Disp Refills    zolpidem (AMBIEN) 10 MG tablet 30 tablet 0     Sig: Take 1 tablet by mouth nightly as needed for Sleep for up to 30 days.  Max Daily Amount: 10 mg       Date of last visit: 9/22/2022  Date of next visit (if applicable):3/22/2023  Pharmacy Name: [facility] [place]

## 2023-02-02 RX ORDER — ZOLPIDEM TARTRATE 10 MG/1
10 TABLET ORAL NIGHTLY PRN
Qty: 15 TABLET | Refills: 0 | Status: SHIPPED | OUTPATIENT
Start: 2023-02-02 | End: 2023-02-17

## 2023-02-10 DIAGNOSIS — F51.01 PRIMARY INSOMNIA: ICD-10-CM

## 2023-02-10 RX ORDER — ZOLPIDEM TARTRATE 10 MG/1
10 TABLET ORAL NIGHTLY PRN
Qty: 30 TABLET | Refills: 0 | Status: SHIPPED | OUTPATIENT
Start: 2023-02-16 | End: 2023-03-18

## 2023-02-10 NOTE — TELEPHONE ENCOUNTER
THIS IS A DR Alia Dozier PATIENT  [facility] called into the office asking for a 30 day supply with 0 refills on her Ambien 10mg;#15 with 0 refills;last sent on 02/02/2023. She said that she is asking for it early due to the last Rx only being sent In for a 15 day supply. I have loaded the Rx for a 30 day supply with 0 refills with a DO NOT START DATE OF 02/16/23 (1 day prior). Please advise otherwise. She is scheduled to return on 03/22/2023.

## 2023-03-22 ENCOUNTER — TELEMEDICINE (OUTPATIENT)
Dept: PSYCHIATRY | Age: 43
End: 2023-03-22
Payer: COMMERCIAL

## 2023-03-22 DIAGNOSIS — F51.01 PRIMARY INSOMNIA: ICD-10-CM

## 2023-03-22 DIAGNOSIS — F41.9 ANXIETY: ICD-10-CM

## 2023-03-22 DIAGNOSIS — F33.42 RECURRENT MAJOR DEPRESSIVE DISORDER, IN FULL REMISSION (HCC): Primary | ICD-10-CM

## 2023-03-22 PROCEDURE — 99213 OFFICE O/P EST LOW 20 MIN: CPT | Performed by: PSYCHIATRY & NEUROLOGY

## 2023-03-22 RX ORDER — CLONAZEPAM 0.5 MG/1
0.5 TABLET ORAL 2 TIMES DAILY PRN
Qty: 60 TABLET | Refills: 2 | Status: SHIPPED | OUTPATIENT
Start: 2023-03-22 | End: 2023-06-20

## 2023-03-22 RX ORDER — NALTREXONE HYDROCHLORIDE 50 MG/1
TABLET, FILM COATED ORAL
Qty: 30 TABLET | Refills: 5 | Status: SHIPPED | OUTPATIENT
Start: 2023-03-22

## 2023-03-22 RX ORDER — ZOLPIDEM TARTRATE 10 MG/1
10 TABLET ORAL NIGHTLY PRN
Qty: 30 TABLET | Refills: 2 | Status: SHIPPED | OUTPATIENT
Start: 2023-03-22 | End: 2023-06-20

## 2023-03-22 RX ORDER — ZOLPIDEM TARTRATE 10 MG/1
TABLET ORAL
Qty: 30 TABLET | OUTPATIENT
Start: 2023-03-22

## 2023-03-22 RX ORDER — BUPROPION HYDROCHLORIDE 150 MG/1
TABLET ORAL
Qty: 90 TABLET | Refills: 5 | Status: SHIPPED | OUTPATIENT
Start: 2023-03-22

## 2023-03-22 RX ORDER — ESCITALOPRAM OXALATE 10 MG/1
10 TABLET ORAL DAILY
Qty: 30 TABLET | Refills: 5 | Status: SHIPPED | OUTPATIENT
Start: 2023-03-22

## 2023-03-22 NOTE — PROGRESS NOTES
COGNITION Insight: Good    Judgement:  Intact    MEMORY  Intact    INTELLIGENCE  Average     Mobility/Gait: Independently     Controlled SubstancesMonitoring: Periodic Controlled Substance Monitoring: Possible medication side effects, risk of tolerance/dependence & alternative treatments discussed., No signs of potential drug abuse or diversion identified. Stefanie Kim MD)    ASSESSMENT: No changes today per her request. Anxiety a bit worse in context of work stressors. Wellbutrin has shown benefit for mood. Lexapro has improved mood and anxiety. Diagnosis Orders   1. Recurrent major depressive disorder, in full remission ([facility] [address]        2. Anxiety  clonazePAM (KLONOPIN) 0.5 MG tablet      3. Primary insomnia  zolpidem (AMBIEN) 10 MG tablet            PLAN:     Medications: Wellbutrin  mg QAM  Ambien 10 mg HS  Naltrexone 50 mg QD   Lexapro 10 mg QD  Klonopin 0.5 mg BID prn anxiety  Therapy: none, [facility] G[facility]  Labs/Tests/Imaging: none   Records Reviewed: [facility]  Patient advised to call if patient has any difficulties with treatment  Return in about 4 months (around 7/22/2023) for follow up, [facility]. [facility], was evaluated through a synchronous (real-time) audio-video encounter. The patient (or guardian if applicable) is aware that this is a billable service, which includes applicable copays. This virtual visit was conducted with patient's (and/or legal guardian's) consent. The visit was conducted pursuant to the emergency declaration under the [facility], [address] [place] [address] authority and the [employer] General Act. Patient identification was verified, and a caregiver was present when appropriate. The patient was located in a state where the provider was licensed to provide care.       Total time spent for this encounter: not billed by

## 2023-06-26 DIAGNOSIS — F51.01 PRIMARY INSOMNIA: ICD-10-CM

## 2023-06-27 NOTE — TELEPHONE ENCOUNTER
Pharmacy requesting a refill of the following medication:    Zolpidem 10mg  #30 with 2 refills to the [facility] in [facility]. Previous RX 03/22/23 with a last fill date of 05/28/23. Last visit 03/22/23  Next visit 07/20/23    Pending your approval for a 30 day supply with 2 refills.

## 2023-06-28 RX ORDER — ZOLPIDEM TARTRATE 10 MG/1
TABLET ORAL
Qty: 30 TABLET | Refills: 2 | Status: SHIPPED | OUTPATIENT
Start: 2023-06-28 | End: 2023-07-28

## 2023-08-22 ENCOUNTER — TELEMEDICINE (OUTPATIENT)
Dept: PSYCHIATRY | Age: 43
End: 2023-08-22
Payer: COMMERCIAL

## 2023-08-22 DIAGNOSIS — F33.42 RECURRENT MAJOR DEPRESSIVE DISORDER, IN FULL REMISSION (HCC): Primary | ICD-10-CM

## 2023-08-22 DIAGNOSIS — F51.01 PRIMARY INSOMNIA: ICD-10-CM

## 2023-08-22 DIAGNOSIS — F41.9 ANXIETY: ICD-10-CM

## 2023-08-22 PROCEDURE — 99213 OFFICE O/P EST LOW 20 MIN: CPT | Performed by: PSYCHIATRY & NEUROLOGY

## 2023-08-22 RX ORDER — BUPROPION HYDROCHLORIDE 150 MG/1
TABLET ORAL
Qty: 90 TABLET | Refills: 5 | Status: SHIPPED | OUTPATIENT
Start: 2023-08-22

## 2023-08-22 RX ORDER — NALTREXONE HYDROCHLORIDE 50 MG/1
TABLET, FILM COATED ORAL
Qty: 30 TABLET | Refills: 5 | Status: SHIPPED | OUTPATIENT
Start: 2023-08-22

## 2023-08-22 RX ORDER — ESCITALOPRAM OXALATE 10 MG/1
10 TABLET ORAL DAILY
Qty: 30 TABLET | Refills: 5 | Status: SHIPPED | OUTPATIENT
Start: 2023-08-22

## 2023-08-22 RX ORDER — ZOLPIDEM TARTRATE 10 MG/1
10 TABLET ORAL NIGHTLY PRN
Qty: 30 TABLET | Refills: 2 | Status: SHIPPED | OUTPATIENT
Start: 2023-08-22 | End: 2023-11-20

## 2023-11-22 DIAGNOSIS — F51.01 PRIMARY INSOMNIA: ICD-10-CM

## 2023-11-22 NOTE — TELEPHONE ENCOUNTER
Nicki Garrido called into the office stating that she needs a refill on her Ambien 10mg;#30 with 2 refills;last with a start date of 08/22/23. Medication is pending your approval for a 30 day supply with 2 refills; she is scheduled to return on 2/22/24. Last seen on 08/22/23.

## 2023-11-25 RX ORDER — ZOLPIDEM TARTRATE 10 MG/1
10 TABLET ORAL NIGHTLY PRN
Qty: 30 TABLET | Refills: 2 | Status: SHIPPED | OUTPATIENT
Start: 2023-11-25 | End: 2024-02-23

## 2024-02-15 RX ORDER — ESCITALOPRAM OXALATE 10 MG/1
10 TABLET ORAL DAILY
Qty: 60 TABLET | OUTPATIENT
Start: 2024-02-15

## 2024-02-19 RX ORDER — ESCITALOPRAM OXALATE 10 MG/1
10 TABLET ORAL DAILY
Qty: 60 TABLET | OUTPATIENT
Start: 2024-02-19

## 2024-03-25 DIAGNOSIS — F51.01 PRIMARY INSOMNIA: ICD-10-CM

## 2024-03-25 RX ORDER — BUPROPION HYDROCHLORIDE 150 MG/1
TABLET ORAL
Qty: 90 TABLET | Refills: 0 | Status: SHIPPED | OUTPATIENT
Start: 2024-03-25

## 2024-03-25 NOTE — TELEPHONE ENCOUNTER
[facility] is requesting a refill on the following medications:  Requested Prescriptions     Pending Prescriptions Disp Refills    buPROPion (WELLBUTRIN XL) 150 MG extended release tablet [Pharmacy Med Name: buPROPion HCL  MG TABLET] 90 tablet 0     Sig: TAKE THREE TABLETS BY MOUTH EVERY MORNING       Date of last visit: 8/22/2023  Date of next visit (if applicable):4/2/2024  [facility] [place]

## 2024-03-26 NOTE — TELEPHONE ENCOUNTER
[facility] pharmacy is requesting a refill on the following medications:  Requested Prescriptions     Pending Prescriptions Disp Refills    zolpidem (AMBIEN) 10 MG tablet [Pharmacy Med Name: ZOLPIDEM TARTRATE 10 MG TABLET] 30 tablet      Sig: TAKE ONE TABLET BY MOUTH EVERY NIGHT AT BEDTIME AS NEEDED FOR SLEEP MAXIMUM DAILY AMOUNT: 10MG    escitalopram (LEXAPRO) 10 MG tablet [Pharmacy Med Name: ESCITALOPRAM 10 MG TABLET] 60 tablet      Sig: TAKE 1 TABLET BY MOUTH DAILY       Date of last visit: 8/22/2023  Date of next visit (if applicable):4/2/2024  [facility] [place]

## 2024-03-27 RX ORDER — ESCITALOPRAM OXALATE 10 MG/1
10 TABLET ORAL DAILY
Qty: 60 TABLET | Refills: 0 | Status: SHIPPED | OUTPATIENT
Start: 2024-03-27

## 2024-03-27 RX ORDER — ZOLPIDEM TARTRATE 10 MG/1
TABLET ORAL
Qty: 30 TABLET | Refills: 0 | Status: SHIPPED | OUTPATIENT
Start: 2024-03-27 | End: 2024-04-26

## 2024-03-27 NOTE — TELEPHONE ENCOUNTER
Patient called to follow up on status. Reports she will be out of medication tomorrow. Advised refills are pending to provider. Patient would like a call back when scripts have been sent.

## 2024-04-02 ENCOUNTER — TELEMEDICINE (OUTPATIENT)
Dept: PSYCHIATRY | Age: 44
End: 2024-04-02
Payer: COMMERCIAL

## 2024-04-02 DIAGNOSIS — F41.9 ANXIETY: ICD-10-CM

## 2024-04-02 DIAGNOSIS — F33.42 RECURRENT MAJOR DEPRESSIVE DISORDER, IN FULL REMISSION (HCC): Primary | ICD-10-CM

## 2024-04-02 DIAGNOSIS — F51.01 PRIMARY INSOMNIA: ICD-10-CM

## 2024-04-02 PROCEDURE — 99213 OFFICE O/P EST LOW 20 MIN: CPT | Performed by: PSYCHIATRY & NEUROLOGY

## 2024-04-02 RX ORDER — NALTREXONE HYDROCHLORIDE 50 MG/1
TABLET, FILM COATED ORAL
Qty: 30 TABLET | Refills: 5 | Status: SHIPPED | OUTPATIENT
Start: 2024-04-02

## 2024-04-02 RX ORDER — BUPROPION HYDROCHLORIDE 150 MG/1
450 TABLET ORAL EVERY MORNING
Qty: 90 TABLET | Refills: 5 | Status: SHIPPED | OUTPATIENT
Start: 2024-04-02

## 2024-04-02 RX ORDER — ZOLPIDEM TARTRATE 10 MG/1
TABLET ORAL
Qty: 30 TABLET | Refills: 2 | Status: SHIPPED | OUTPATIENT
Start: 2024-04-02 | End: 2024-07-01

## 2024-04-02 RX ORDER — ESCITALOPRAM OXALATE 10 MG/1
10 TABLET ORAL DAILY
Qty: 60 TABLET | Refills: 5 | Status: SHIPPED | OUTPATIENT
Start: 2024-04-02

## 2024-04-02 NOTE — PROGRESS NOTES
[facility]  [address] [address] [phone]    Progress Note    Patient:  [place] [place]  YOB: 1980  PCP:  No primary care provider on file.  Visit Date:  4/2/2024      Chief Complaint   Patient presents with    Follow-up    Medication Check    Depression    Anxiety    Insomnia       SUBJECTIVE:      [place], a 44 y.o. female, presents for a follow up visit.      Presents alone.   Doing well.   Anxiety has been better overall. Hasn't used used Klonopin since prior to August. Less stressors going on.   Mood is good, stable.   We discussed risk of long term BZD and Ambien use. Worsening memory.   Sleep is very good with Ambien. Has tried a lot of other sleep options.   I suggested CBTi group and she is agreeable to referral.   Unsure if naltrexone helps with weight loss. She's considering other options. Appetite is okay.   \"Best I've been in a long time.\"   Declines to make any medication changes today.   No SI or psychosis.      Med Trials: Prozac, Wellbutrin XL, Lexapro, Vistaril, Atarax, Klonopin, Ambien, Buspar, trazodone, naltrexone, Inderal    OBJECTIVE:  Vitals: There were no vitals taken for this visit.    MENTAL STATUS EXAM:    GENERAL  Build: Normal    Hygiene:  Appropriate   SENSORIUM Orientation: Place, Person, Time, & Situation     Consciousness: Alert    ATTENTION   Focused    RELATEDNESS  Cooperative    EYE CONTACT   Good    PSYCHOMOTOR  Normal    SPEECH Volume: Normal     Rate: Normal rate and tone    Amplitude: Within normal limits   MOOD  Euthymic     AFFECT Range: Full, bright   THOUGHT Process:  Goal-Directed     Content: no evidence of psychosis    COGNITION Insight: Good    Judgement:  Intact    MEMORY  Intact    INTELLIGENCE  Average     Mobility/Gait: Independently     Controlled SubstancesMonitoring: Periodic Controlled Substance Monitoring: Possible medication side

## 2024-04-23 RX ORDER — BUPROPION HYDROCHLORIDE 150 MG/1
TABLET ORAL
Qty: 90 TABLET | Refills: 5 | OUTPATIENT
Start: 2024-04-23

## 2024-07-24 DIAGNOSIS — F51.01 PRIMARY INSOMNIA: ICD-10-CM

## 2024-07-25 RX ORDER — ZOLPIDEM TARTRATE 10 MG/1
TABLET ORAL
Qty: 30 TABLET | Refills: 2 | Status: SHIPPED | OUTPATIENT
Start: 2024-07-25 | End: 2025-07-25

## 2024-10-02 RX ORDER — ESCITALOPRAM OXALATE 10 MG/1
10 TABLET ORAL DAILY
Qty: 60 TABLET | Refills: 2 | Status: SHIPPED | OUTPATIENT
Start: 2024-10-02

## 2024-10-02 RX ORDER — BUPROPION HYDROCHLORIDE 150 MG/1
450 TABLET ORAL EVERY MORNING
Qty: 90 TABLET | Refills: 2 | Status: SHIPPED | OUTPATIENT
Start: 2024-10-02

## 2024-10-02 RX ORDER — NALTREXONE HYDROCHLORIDE 50 MG/1
TABLET, FILM COATED ORAL
Qty: 30 TABLET | Refills: 2 | Status: SHIPPED | OUTPATIENT
Start: 2024-10-02

## 2024-10-02 NOTE — TELEPHONE ENCOUNTER
I reached out to [facility] to get her r/sed due to this provider being out of the office; while on the phone she said that most of her medications would need refilled. Looking at her medications, she is in need of the following:    Depade 50mg;#30 with 5 refills, Lexapro 10mg;#30 with 5 refills, Wellbutrin XL 150mg;#90 with 0 refills; last sent on 04/02/24.    She was r/sed for the next available on 11/20/24. Medications are pending your approval for a 30 day supply with 2 refills.

## 2024-10-23 DIAGNOSIS — F51.01 PRIMARY INSOMNIA: ICD-10-CM

## 2024-10-24 RX ORDER — ZOLPIDEM TARTRATE 10 MG/1
10 TABLET ORAL NIGHTLY PRN
Qty: 30 TABLET | Refills: 0 | Status: SHIPPED | OUTPATIENT
Start: 2024-10-24 | End: 2024-11-23

## 2024-10-24 NOTE — TELEPHONE ENCOUNTER
[facility] is requesting a refill on the following medications:  Requested Prescriptions     Pending Prescriptions Disp Refills    zolpidem (AMBIEN) 10 MG tablet [Pharmacy Med Name: ZOLPIDEM TARTRATE 10 MG TABLET] 30 tablet      Sig: TAKE 1 TABLET BY MOUTH EVERY NIGHT AT BEDTIME AS NEEDED FOR SLEEP. MAX 1 TABLET PER DAY.       Date of last visit: 4/2/2024  Date of next visit (if applicable):11/20/2024  Pharmacy Name: [facility]

## 2024-11-22 DIAGNOSIS — F51.01 PRIMARY INSOMNIA: ICD-10-CM

## 2024-11-25 RX ORDER — ZOLPIDEM TARTRATE 10 MG/1
TABLET ORAL
Qty: 30 TABLET | Refills: 1 | Status: SHIPPED | OUTPATIENT
Start: 2024-11-25 | End: 2025-01-24

## 2024-11-25 NOTE — TELEPHONE ENCOUNTER
[facility] is requesting a refill on the following medications:  Requested Prescriptions     Pending Prescriptions Disp Refills    zolpidem (AMBIEN) 10 MG tablet [Pharmacy Med Name: ZOLPIDEM TARTRATE 10 MG TABLET] 30 tablet      Sig: TAKE ONE TABLET BY MOUTH ONCE NIGHTLY AS NEEDED FOR SLEEP FOR UP TO 30 DAYS. MAX DAILY AMOUNT 10MG.       Date of last visit: 4/2/2024  Date of next visit (if applicable):1/2/2025  Pharmacy Name: [facility]

## 2024-12-17 NOTE — TELEPHONE ENCOUNTER
I have left a message for Monique Mcgrath to return my call; to verify if she has medication left or if she is out; and how long she has been out. Last refill dates are marked as 05/13/20 and 05/17/20 both for 30 day supplies? I reached out to the pharmacy as well; they said that the last refill dates were on 05/14/20 for the naltrexone for a 30 day supply and 05/23/20 for Wellbutrin xl 150mg for a 30 day supply as well.
THIS IS A DR. REYES PATIENT; SHE IS OUT. Theresa Simons called back into the office stating that she had received my voicemails. She said that she has about 10 days left of her Naltrexone and reports that she takes 1 daily and the Wellbutrin XL 150mg; she reports she takes 3 daily; having about 2-3 days left. Both medications are pending your approval for a 30 day supply with 0 refills due to the inconsistent last fill dates and the days remaining for her medications.
Ivett Short
Dr. Fortune
Dr. Hanks

## 2025-01-02 ENCOUNTER — TELEMEDICINE (OUTPATIENT)
Dept: PSYCHIATRY | Age: 45
End: 2025-01-02
Payer: COMMERCIAL

## 2025-01-02 DIAGNOSIS — F41.9 ANXIETY: ICD-10-CM

## 2025-01-02 DIAGNOSIS — F33.42 RECURRENT MAJOR DEPRESSIVE DISORDER, IN FULL REMISSION (HCC): Primary | ICD-10-CM

## 2025-01-02 DIAGNOSIS — F51.01 PRIMARY INSOMNIA: ICD-10-CM

## 2025-01-02 PROCEDURE — 99213 OFFICE O/P EST LOW 20 MIN: CPT | Performed by: PSYCHIATRY & NEUROLOGY

## 2025-01-02 RX ORDER — ESCITALOPRAM OXALATE 10 MG/1
10 TABLET ORAL DAILY
Qty: 60 TABLET | Refills: 5 | Status: SHIPPED | OUTPATIENT
Start: 2025-01-02

## 2025-01-02 RX ORDER — NALTREXONE HYDROCHLORIDE 50 MG/1
TABLET, FILM COATED ORAL
Qty: 30 TABLET | Refills: 5 | Status: SHIPPED | OUTPATIENT
Start: 2025-01-02

## 2025-01-02 RX ORDER — BUPROPION HYDROCHLORIDE 150 MG/1
450 TABLET ORAL EVERY MORNING
Qty: 90 TABLET | Refills: 5 | Status: SHIPPED | OUTPATIENT
Start: 2025-01-02

## 2025-01-02 RX ORDER — ZOLPIDEM TARTRATE 10 MG/1
10 TABLET ORAL NIGHTLY PRN
Qty: 30 TABLET | Refills: 2 | Status: SHIPPED | OUTPATIENT
Start: 2025-01-02 | End: 2025-04-02

## 2025-01-02 ASSESSMENT — PATIENT HEALTH QUESTIONNAIRE - PHQ9
3. TROUBLE FALLING OR STAYING ASLEEP: NOT AT ALL
10. IF YOU CHECKED OFF ANY PROBLEMS, HOW DIFFICULT HAVE THESE PROBLEMS MADE IT FOR YOU TO DO YOUR WORK, TAKE CARE OF THINGS AT HOME, OR GET ALONG WITH OTHER PEOPLE: NOT DIFFICULT AT ALL
1. LITTLE INTEREST OR PLEASURE IN DOING THINGS: NOT AT ALL
8. MOVING OR SPEAKING SO SLOWLY THAT OTHER PEOPLE COULD HAVE NOTICED. OR THE OPPOSITE, BEING SO FIGETY OR RESTLESS THAT YOU HAVE BEEN MOVING AROUND A LOT MORE THAN USUAL: NOT AT ALL
8. MOVING OR SPEAKING SO SLOWLY THAT OTHER PEOPLE COULD HAVE NOTICED. OR THE OPPOSITE - BEING SO FIDGETY OR RESTLESS THAT YOU HAVE BEEN MOVING AROUND A LOT MORE THAN USUAL: NOT AT ALL
7. TROUBLE CONCENTRATING ON THINGS, SUCH AS READING THE NEWSPAPER OR WATCHING TELEVISION: NOT AT ALL
2. FEELING DOWN, DEPRESSED OR HOPELESS: NOT AT ALL
6. FEELING BAD ABOUT YOURSELF - OR THAT YOU ARE A FAILURE OR HAVE LET YOURSELF OR YOUR FAMILY DOWN: NOT AT ALL
SUM OF ALL RESPONSES TO PHQ QUESTIONS 1-9: 0
5. POOR APPETITE OR OVEREATING: NOT AT ALL
SUM OF ALL RESPONSES TO PHQ QUESTIONS 1-9: 0
1. LITTLE INTEREST OR PLEASURE IN DOING THINGS: NOT AT ALL
SUM OF ALL RESPONSES TO PHQ QUESTIONS 1-9: 0
10. IF YOU CHECKED OFF ANY PROBLEMS, HOW DIFFICULT HAVE THESE PROBLEMS MADE IT FOR YOU TO DO YOUR WORK, TAKE CARE OF THINGS AT HOME, OR GET ALONG WITH OTHER PEOPLE: NOT DIFFICULT AT ALL
SUM OF ALL RESPONSES TO PHQ QUESTIONS 1-9: 0
5. POOR APPETITE OR OVEREATING: NOT AT ALL
6. FEELING BAD ABOUT YOURSELF - OR THAT YOU ARE A FAILURE OR HAVE LET YOURSELF OR YOUR FAMILY DOWN: NOT AT ALL
9. THOUGHTS THAT YOU WOULD BE BETTER OFF DEAD, OR OF HURTING YOURSELF: NOT AT ALL
4. FEELING TIRED OR HAVING LITTLE ENERGY: NOT AT ALL
SUM OF ALL RESPONSES TO PHQ9 QUESTIONS 1 & 2: 0
SUM OF ALL RESPONSES TO PHQ QUESTIONS 1-9: 0
4. FEELING TIRED OR HAVING LITTLE ENERGY: NOT AT ALL
9. THOUGHTS THAT YOU WOULD BE BETTER OFF DEAD, OR OF HURTING YOURSELF: NOT AT ALL
2. FEELING DOWN, DEPRESSED OR HOPELESS: NOT AT ALL
7. TROUBLE CONCENTRATING ON THINGS, SUCH AS READING THE NEWSPAPER OR WATCHING TELEVISION: NOT AT ALL
3. TROUBLE FALLING OR STAYING ASLEEP: NOT AT ALL

## 2025-01-02 NOTE — PROGRESS NOTES
[facility]  [address] [facility] [address] [phone]    Progress Note    Patient:  [place]  YOB: 1980  PCP:  No primary care provider on file.  Visit Date:  1/2/2025      Chief Complaint   Patient presents with    Follow-up    Medication Check    Depression    Anxiety    Insomnia       SUBJECTIVE:      [place], a 44 y.o.female, presents for a follow up visit.      Presents alone.   Doing well.   Mood \"Great\". Stable.   Had good holidays. Notes going to a holiday event with her friend tonight.   For 2025 will be working 100% from home.   Didn't mind going in 2-3 times per week. Thinks she might miss the social aspect.  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   Hasn't needed any Klonopin noting anxiety is under control.   Appetite and energy are okay.   Sleep is good.   Fears going off Ambien. Tried many other sleep aids with poor experience. Doesn't wake up groggy with it. We again discussed possible cognitive risks with longer term Ambien use.,  Has 2 dogs. Got a puppy. [place].   No SI or psychosis. Never followed up on CBTi group.   Declines to make any medication changes today.       Med Trials: Prozac, Wellbutrin XL, Lexapro, Vistaril, Atarax, Klonopin, Ambien, Buspar, trazodone, naltrexone, Inderal    OBJECTIVE:  Vitals: There were no vitals taken for this visit.    MENTAL STATUS EXAM:    GENERAL  Build: Normal    Hygiene:  Appropriate in casual dress   SENSORIUM Orientation: Place, Person, Time, & Situation     Consciousness: Alert    ATTENTION   Focused    RELATEDNESS  Cooperative    EYE CONTACT   Good    PSYCHOMOTOR  Normal    SPEECH Volume: Normal     Rate: Normal rate and tone    Amplitude: Within normal limits   MOOD  \"Great\"     AFFECT Range: Full, mood congruent, smiling   THOUGHT Process:  Goal-Directed     Content: no evidence of psychosis    COGNITION Insight: Good    Judgement:

## 2025-04-22 DIAGNOSIS — F51.01 PRIMARY INSOMNIA: ICD-10-CM

## 2025-04-23 NOTE — TELEPHONE ENCOUNTER
[facility] is requesting a refill on the following medications:  Requested Prescriptions     Pending Prescriptions Disp Refills    zolpidem (AMBIEN) 10 MG tablet [Pharmacy Med Name: ZOLPIDEM TARTRATE 10 MG TABLET] 30 tablet      Sig: TAKE ONE TABLET BY MOUTH ONCE NIGHTLY AS NEEDED FOR SLEEP. MAX DAILY AMOUNT 10MG.       Date of last visit: 1/2/2025  Date of next visit (if applicable):7/2/2025  Date Rx last sent by provider: 01/02/2025  Pharmacy Name: Fla[facility] Velazquez [facility]

## 2025-04-24 RX ORDER — ZOLPIDEM TARTRATE 10 MG/1
TABLET ORAL
Qty: 30 TABLET | Refills: 2 | Status: SHIPPED | OUTPATIENT
Start: 2025-04-24 | End: 2025-07-23

## 2025-07-21 RX ORDER — BUPROPION HYDROCHLORIDE 150 MG/1
TABLET ORAL
Qty: 270 TABLET | Refills: 0 | Status: SHIPPED | OUTPATIENT
Start: 2025-07-21

## 2025-07-21 NOTE — TELEPHONE ENCOUNTER
[facility] is requesting a refill on the following medications:  Requested Prescriptions     Pending Prescriptions Disp Refills    buPROPion (WELLBUTRIN XL) 150 MG extended release tablet [Pharmacy Med Name: buPROPion HCL  MG TABLET] 270 tablet 0     Sig: TAKE THREE TABLETS BY MOUTH EVERY MORNING       Date of last visit: 1/2/2025  Date of next visit (if applicable):7/24/2025  Date Rx last sent by provider: 01/02/2025  Pharmacy Name: [facility]

## 2025-07-24 DIAGNOSIS — F51.01 PRIMARY INSOMNIA: ICD-10-CM

## 2025-07-24 RX ORDER — ZOLPIDEM TARTRATE 10 MG/1
TABLET ORAL
Qty: 30 TABLET | Refills: 0 | Status: SHIPPED | OUTPATIENT
Start: 2025-07-24 | End: 2025-08-23

## 2025-07-24 NOTE — TELEPHONE ENCOUNTER
[facility] is requesting a refill on the following medications:  Requested Prescriptions     Pending Prescriptions Disp Refills    zolpidem (AMBIEN) 10 MG tablet [Pharmacy Med Name: ZOLPIDEM TARTRATE 10 MG TABLET] 30 tablet 0     Sig: TAKE 1 TABLET BY MOUTH EVERY NIGHT AT BEDTIME AS NEEDED FOR SLEEP     *No-showed today's appt.     Date of last visit: 1/2/2025  Date of next visit (if applicable):8/20/2025  Date Rx last sent by provider: 4/24/2025  Pharmacy Name: [facility]

## 2025-08-20 ENCOUNTER — TELEMEDICINE (OUTPATIENT)
Dept: PSYCHIATRY | Age: 45
End: 2025-08-20
Payer: COMMERCIAL

## 2025-08-20 DIAGNOSIS — F51.01 PRIMARY INSOMNIA: ICD-10-CM

## 2025-08-20 DIAGNOSIS — F41.9 ANXIETY: ICD-10-CM

## 2025-08-20 DIAGNOSIS — F33.42 RECURRENT MAJOR DEPRESSIVE DISORDER, IN FULL REMISSION: Primary | ICD-10-CM

## 2025-08-20 PROCEDURE — 99213 OFFICE O/P EST LOW 20 MIN: CPT | Performed by: PSYCHIATRY & NEUROLOGY

## 2025-08-20 RX ORDER — BUPROPION HYDROCHLORIDE 150 MG/1
450 TABLET ORAL EVERY MORNING
Qty: 270 TABLET | Refills: 1 | Status: SHIPPED | OUTPATIENT
Start: 2025-08-20

## 2025-08-20 RX ORDER — ESCITALOPRAM OXALATE 10 MG/1
10 TABLET ORAL DAILY
Qty: 90 TABLET | Refills: 1 | Status: SHIPPED | OUTPATIENT
Start: 2025-08-20

## 2025-08-20 RX ORDER — ZOLPIDEM TARTRATE 10 MG/1
10 TABLET ORAL NIGHTLY PRN
Qty: 30 TABLET | Refills: 2 | Status: SHIPPED | OUTPATIENT
Start: 2025-08-20 | End: 2025-11-18

## 2025-08-20 RX ORDER — NALTREXONE HYDROCHLORIDE 50 MG/1
TABLET, FILM COATED ORAL
Qty: 90 TABLET | Refills: 1 | Status: SHIPPED | OUTPATIENT
Start: 2025-08-20

## 2025-08-20 ASSESSMENT — ANXIETY QUESTIONNAIRES
2. NOT BEING ABLE TO STOP OR CONTROL WORRYING: NOT AT ALL
5. BEING SO RESTLESS THAT IT IS HARD TO SIT STILL: NOT AT ALL
6. BECOMING EASILY ANNOYED OR IRRITABLE: NOT AT ALL
5. BEING SO RESTLESS THAT IT IS HARD TO SIT STILL: NOT AT ALL
7. FEELING AFRAID AS IF SOMETHING AWFUL MIGHT HAPPEN: NOT AT ALL
6. BECOMING EASILY ANNOYED OR IRRITABLE: NOT AT ALL
3. WORRYING TOO MUCH ABOUT DIFFERENT THINGS: NOT AT ALL
2. NOT BEING ABLE TO STOP OR CONTROL WORRYING: NOT AT ALL
GAD7 TOTAL SCORE: 0
1. FEELING NERVOUS, ANXIOUS, OR ON EDGE: NOT AT ALL
4. TROUBLE RELAXING: NOT AT ALL
7. FEELING AFRAID AS IF SOMETHING AWFUL MIGHT HAPPEN: NOT AT ALL
4. TROUBLE RELAXING: NOT AT ALL
1. FEELING NERVOUS, ANXIOUS, OR ON EDGE: NOT AT ALL
3. WORRYING TOO MUCH ABOUT DIFFERENT THINGS: NOT AT ALL

## 2025-08-20 ASSESSMENT — PATIENT HEALTH QUESTIONNAIRE - PHQ9
4. FEELING TIRED OR HAVING LITTLE ENERGY: NOT AT ALL
10. IF YOU CHECKED OFF ANY PROBLEMS, HOW DIFFICULT HAVE THESE PROBLEMS MADE IT FOR YOU TO DO YOUR WORK, TAKE CARE OF THINGS AT HOME, OR GET ALONG WITH OTHER PEOPLE: NOT DIFFICULT AT ALL
SUM OF ALL RESPONSES TO PHQ QUESTIONS 1-9: 0
10. IF YOU CHECKED OFF ANY PROBLEMS, HOW DIFFICULT HAVE THESE PROBLEMS MADE IT FOR YOU TO DO YOUR WORK, TAKE CARE OF THINGS AT HOME, OR GET ALONG WITH OTHER PEOPLE: NOT DIFFICULT AT ALL
SUM OF ALL RESPONSES TO PHQ QUESTIONS 1-9: 0
7. TROUBLE CONCENTRATING ON THINGS, SUCH AS READING THE NEWSPAPER OR WATCHING TELEVISION: NOT AT ALL
9. THOUGHTS THAT YOU WOULD BE BETTER OFF DEAD, OR OF HURTING YOURSELF: NOT AT ALL
3. TROUBLE FALLING OR STAYING ASLEEP: NOT AT ALL
4. FEELING TIRED OR HAVING LITTLE ENERGY: NOT AT ALL
SUM OF ALL RESPONSES TO PHQ QUESTIONS 1-9: 0
8. MOVING OR SPEAKING SO SLOWLY THAT OTHER PEOPLE COULD HAVE NOTICED. OR THE OPPOSITE, BEING SO FIGETY OR RESTLESS THAT YOU HAVE BEEN MOVING AROUND A LOT MORE THAN USUAL: NOT AT ALL
6. FEELING BAD ABOUT YOURSELF - OR THAT YOU ARE A FAILURE OR HAVE LET YOURSELF OR YOUR FAMILY DOWN: NOT AT ALL
SUM OF ALL RESPONSES TO PHQ QUESTIONS 1-9: 0
2. FEELING DOWN, DEPRESSED OR HOPELESS: NOT AT ALL
6. FEELING BAD ABOUT YOURSELF - OR THAT YOU ARE A FAILURE OR HAVE LET YOURSELF OR YOUR FAMILY DOWN: NOT AT ALL
5. POOR APPETITE OR OVEREATING: NOT AT ALL
9. THOUGHTS THAT YOU WOULD BE BETTER OFF DEAD, OR OF HURTING YOURSELF: NOT AT ALL
7. TROUBLE CONCENTRATING ON THINGS, SUCH AS READING THE NEWSPAPER OR WATCHING TELEVISION: NOT AT ALL
1. LITTLE INTEREST OR PLEASURE IN DOING THINGS: NOT AT ALL
3. TROUBLE FALLING OR STAYING ASLEEP: NOT AT ALL
1. LITTLE INTEREST OR PLEASURE IN DOING THINGS: NOT AT ALL
2. FEELING DOWN, DEPRESSED OR HOPELESS: NOT AT ALL
5. POOR APPETITE OR OVEREATING: NOT AT ALL
8. MOVING OR SPEAKING SO SLOWLY THAT OTHER PEOPLE COULD HAVE NOTICED. OR THE OPPOSITE - BEING SO FIDGETY OR RESTLESS THAT YOU HAVE BEEN MOVING AROUND A LOT MORE THAN USUAL: NOT AT ALL
SUM OF ALL RESPONSES TO PHQ QUESTIONS 1-9: 0